# Patient Record
Sex: MALE | Race: WHITE | Employment: STUDENT | RURAL
[De-identification: names, ages, dates, MRNs, and addresses within clinical notes are randomized per-mention and may not be internally consistent; named-entity substitution may affect disease eponyms.]

---
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
## 2021-01-04 DIAGNOSIS — F90.2 ATTENTION DEFICIT HYPERACTIVITY DISORDER (ADHD), COMBINED TYPE: ICD-10-CM

## 2021-01-04 DIAGNOSIS — G89.29 CHRONIC MIDLINE LOW BACK PAIN WITHOUT SCIATICA: ICD-10-CM

## 2021-01-04 DIAGNOSIS — G89.29 CHRONIC MIDLINE THORACIC BACK PAIN: ICD-10-CM

## 2021-01-04 DIAGNOSIS — M54.6 CHRONIC MIDLINE THORACIC BACK PAIN: ICD-10-CM

## 2021-01-04 DIAGNOSIS — M54.50 CHRONIC MIDLINE LOW BACK PAIN WITHOUT SCIATICA: ICD-10-CM

## 2021-01-04 RX ORDER — HYDROCODONE BITARTRATE AND ACETAMINOPHEN 7.5; 325 MG/1; MG/1
1 TABLET ORAL EVERY 6 HOURS PRN
Qty: 90 TABLET | Refills: 0 | Status: SHIPPED | OUTPATIENT
Start: 2021-01-04 | End: 2021-02-22 | Stop reason: SDUPTHER

## 2021-01-04 RX ORDER — DEXTROAMPHETAMINE SACCHARATE, AMPHETAMINE ASPARTATE MONOHYDRATE, DEXTROAMPHETAMINE SULFATE AND AMPHETAMINE SULFATE 5; 5; 5; 5 MG/1; MG/1; MG/1; MG/1
CAPSULE, EXTENDED RELEASE ORAL
Qty: 30 CAPSULE | Refills: 0 | Status: SHIPPED | OUTPATIENT
Start: 2021-01-04 | End: 2021-02-10 | Stop reason: SDUPTHER

## 2021-01-11 RX ORDER — CLONIDINE HYDROCHLORIDE 0.2 MG/1
TABLET ORAL
Qty: 60 TABLET | Refills: 5 | Status: SHIPPED | OUTPATIENT
Start: 2021-01-11 | End: 2021-06-28 | Stop reason: SDUPTHER

## 2021-01-11 NOTE — TELEPHONE ENCOUNTER
Refill request received from pharmacy. Medication pending for approval.       Last Office Visit With PCP:  12/02/2020    Next Visit Date:  No future appointments.     ATN bailey

## 2021-02-10 DIAGNOSIS — F90.2 ATTENTION DEFICIT HYPERACTIVITY DISORDER (ADHD), COMBINED TYPE: ICD-10-CM

## 2021-02-10 DIAGNOSIS — T45.1X5A CHEMOTHERAPY-INDUCED PERIPHERAL NEUROPATHY (HCC): ICD-10-CM

## 2021-02-10 DIAGNOSIS — G89.29 CHRONIC MIDLINE LOW BACK PAIN WITHOUT SCIATICA: ICD-10-CM

## 2021-02-10 DIAGNOSIS — G89.29 CHRONIC MIDLINE THORACIC BACK PAIN: ICD-10-CM

## 2021-02-10 DIAGNOSIS — M54.50 CHRONIC MIDLINE LOW BACK PAIN WITHOUT SCIATICA: ICD-10-CM

## 2021-02-10 DIAGNOSIS — M54.6 CHRONIC MIDLINE THORACIC BACK PAIN: ICD-10-CM

## 2021-02-10 DIAGNOSIS — G62.0 CHEMOTHERAPY-INDUCED PERIPHERAL NEUROPATHY (HCC): ICD-10-CM

## 2021-02-10 RX ORDER — GABAPENTIN 100 MG/1
100 CAPSULE ORAL 2 TIMES DAILY
Qty: 90 CAPSULE | Refills: 2 | Status: SHIPPED | OUTPATIENT
Start: 2021-02-10 | End: 2021-03-22 | Stop reason: SDUPTHER

## 2021-02-10 RX ORDER — DEXTROAMPHETAMINE SACCHARATE, AMPHETAMINE ASPARTATE MONOHYDRATE, DEXTROAMPHETAMINE SULFATE AND AMPHETAMINE SULFATE 5; 5; 5; 5 MG/1; MG/1; MG/1; MG/1
CAPSULE, EXTENDED RELEASE ORAL
Qty: 30 CAPSULE | Refills: 0 | Status: SHIPPED | OUTPATIENT
Start: 2021-02-10 | End: 2021-03-22 | Stop reason: SDUPTHER

## 2021-02-10 NOTE — TELEPHONE ENCOUNTER
Patient called, requested refill. Next Office Visit Date:  No future appointments.     Delia Taylor for review

## 2021-02-11 DIAGNOSIS — J30.89 ENVIRONMENTAL AND SEASONAL ALLERGIES: ICD-10-CM

## 2021-02-12 RX ORDER — CETIRIZINE HYDROCHLORIDE 10 MG/1
TABLET ORAL
Qty: 90 TABLET | Refills: 3 | Status: SHIPPED | OUTPATIENT
Start: 2021-02-12 | End: 2021-03-22 | Stop reason: SDUPTHER

## 2021-02-22 ENCOUNTER — VIRTUAL VISIT (OUTPATIENT)
Dept: FAMILY MEDICINE CLINIC | Age: 21
End: 2021-02-22
Payer: MEDICAID

## 2021-02-22 DIAGNOSIS — M54.50 CHRONIC MIDLINE LOW BACK PAIN WITHOUT SCIATICA: ICD-10-CM

## 2021-02-22 DIAGNOSIS — G89.29 CHRONIC MIDLINE LOW BACK PAIN WITHOUT SCIATICA: ICD-10-CM

## 2021-02-22 DIAGNOSIS — M54.6 CHRONIC MIDLINE THORACIC BACK PAIN: ICD-10-CM

## 2021-02-22 DIAGNOSIS — C91.00: ICD-10-CM

## 2021-02-22 DIAGNOSIS — G89.29 CHRONIC MIDLINE THORACIC BACK PAIN: ICD-10-CM

## 2021-02-22 PROCEDURE — 96160 PT-FOCUSED HLTH RISK ASSMT: CPT | Performed by: NURSE PRACTITIONER

## 2021-02-22 PROCEDURE — 99212 OFFICE O/P EST SF 10 MIN: CPT | Performed by: NURSE PRACTITIONER

## 2021-02-22 RX ORDER — HYDROCODONE BITARTRATE AND ACETAMINOPHEN 7.5; 325 MG/1; MG/1
1 TABLET ORAL EVERY 6 HOURS PRN
Qty: 90 TABLET | Refills: 0 | Status: SHIPPED | OUTPATIENT
Start: 2021-02-22 | End: 2021-03-22 | Stop reason: SDUPTHER

## 2021-02-22 RX ORDER — HYDROCODONE BITARTRATE AND ACETAMINOPHEN 7.5; 325 MG/1; MG/1
1 TABLET ORAL EVERY 6 HOURS PRN
Qty: 90 TABLET | Refills: 0 | Status: CANCELLED | OUTPATIENT
Start: 2021-02-22 | End: 2021-03-24

## 2021-02-22 ASSESSMENT — ENCOUNTER SYMPTOMS
BACK PAIN: 1
TROUBLE SWALLOWING: 0
COUGH: 0
DIARRHEA: 0
ABDOMINAL PAIN: 0
VOMITING: 0
NAUSEA: 0
EYE PAIN: 0
EYE REDNESS: 0
BOWEL INCONTINENCE: 0
SHORTNESS OF BREATH: 0
SORE THROAT: 0
CHEST TIGHTNESS: 0
COLOR CHANGE: 0

## 2021-02-22 ASSESSMENT — PATIENT HEALTH QUESTIONNAIRE - PHQ9
SUM OF ALL RESPONSES TO PHQ QUESTIONS 1-9: 2
SUM OF ALL RESPONSES TO PHQ QUESTIONS 1-9: 2
1. LITTLE INTEREST OR PLEASURE IN DOING THINGS: 1
SUM OF ALL RESPONSES TO PHQ QUESTIONS 1-9: 2

## 2021-02-22 NOTE — PROGRESS NOTES
SUBJECTIVE:    Patient ID: Chayito Perez is a 24 y.o. male. Chief Complaint   Patient presents with    Back Pain    Follow-up    Difficulty Walking     HPI:    Patient following up today for chronic midline thoracic and lumbar back pain with chronic muscle weakness and debility in bilateral lower extremities due to history of cancer havign to have Chemo and radiation at a young age. Patient uses leg braces to assist in ambulation, but is only able to use them for short periods of times due to pain and weakness. Patient needing refill today for his Norco 5mg PO Q6h prn that aids slightly with his chronic pain. Patient graduated virtually from high school but due to his debilities I dont foresee patient to be able to work full time due to his health history, weakness, fatigue, and chronic pain with difficulty ambulating. .    Back Pain  This is a chronic problem. The current episode started more than 1 year ago. The problem occurs constantly. The problem is unchanged. The pain is present in the lumbar spine and thoracic spine. The quality of the pain is described as aching. The pain does not radiate. The pain is at a severity of 8/10. The pain is moderate. The pain is the same all the time. The symptoms are aggravated by lying down, standing, stress and position. Stiffness is present all day. Associated symptoms include leg pain. Pertinent negatives include no abdominal pain, bladder incontinence, bowel incontinence, chest pain, dysuria, fever, headaches, numbness, paresis, paresthesias, pelvic pain, perianal numbness, tingling or weakness. Risk factors include history of cancer, lack of exercise and sedentary lifestyle. He has tried analgesics (leg braces) for the symptoms. The treatment provided mild relief.         Active Ambulatory Problems     Diagnosis Date Noted    ADHD (attention deficit hyperactivity disorder) 03/27/2014    Reactive depression 08/08/2017    Anxiety 08/08/2017    Chemotherapy-induced peripheral neuropathy (HCC) 08/08/2017    Chronic midline thoracic back pain 08/08/2017    Chronic midline low back pain without sciatica 08/08/2017    Bilateral foot-drop 08/08/2017    Muscular weakness 08/08/2017    Fatigue 08/08/2017    Adjustment disorder with mixed anxiety and depressed mood 08/08/2017    Insomnia 08/08/2017    Gastroesophageal reflux disease without esophagitis 08/08/2017    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 Family History   Problem Relation Age of Onset    Other Maternal Grandmother     Other Maternal Grandfather        Patient's medications, allergies, past medical, surgical, social and family histories were reviewed and updated as appropriate. Current Outpatient Medications on File Prior to Visit   Medication Sig Dispense Refill    cetirizine (ZYRTEC) 10 MG tablet TAKE 1 TABLET BY MOUTH EVERY DAY 90 tablet 3    amphetamine-dextroamphetamine (ADDERALL XR) 20 MG extended release capsule take 1 capsule by mouth every morning 30 capsule 0    gabapentin (NEURONTIN) 100 MG capsule Take 1 capsule by mouth 2 times daily for 30 days.  90 capsule 2    cloNIDine (CATAPRES) 0.2 MG tablet TAKE 1 TABLET BY MOUTH TWICE DAILY AS NEEDED FOR ANXIETY 60 tablet 5    pantoprazole (PROTONIX) 20 MG tablet TAKE 1 TABLET BY MOUTH EVERY MORNING BEFORE BREAKFAST 30 tablet 5    RA MELATONIN 5 MG TABS tablet Take 1 tablet by mouth nightly 30 tablet 5    vitamin D3 (CHOLECALCIFEROL) 10 MCG (400 UNIT) TABS tablet Take 1 tablet by mouth daily 30 tablet 5    tretinoin (ALTRALIN) 0.05 % gel Apply 1 Applicatorful topically nightly 1 Tube 2    hydrOXYzine (VISTARIL) 25 MG capsule Take 1 capsule by mouth nightly Take 1 tablet nightly or as needed for Anxiety 60 capsule 5    escitalopram (LEXAPRO) 20 MG tablet Take 1 tablet by mouth daily 30 tablet 5    fluticasone (FLONASE) 50 MCG/ACT nasal spray INSTILL 1 SPRAY INTO EACH NOSTRIL ONCE DAILY FOR 7 DAYS THEN IF NEEDED 64 g 5    aluminum chloride (DRYSOL) 20 % external solution Apply 1 applicator topically nightly 1 Bottle 0    albuterol (PROVENTIL) (2.5 MG/3ML) 0.083% nebulizer solution Take 3 mLs by nebulization daily as needed for Wheezing 120 each 5    promethazine (PHENERGAN) 25 MG tablet Take 1 tablet by mouth as needed for Nausea 30 tablet 5    guaiFENesin (MUCINEX) 600 MG extended release tablet Take 1 tablet by mouth 2 times daily 60 tablet 5    glycopyrrolate (ROBINUL) 1 MG tablet Take 1 mg by mouth 3 times daily      lidocaine-prilocaine (EMLA) 2.5-2.5 % cream APPLY TOPICALLY TO PORT SITE 30 MINUTES PRIOR TO ACCESS 30 g 5     No current facility-administered medications on file prior to visit. Review of Systems   Constitutional: Positive for fatigue. Negative for activity change, appetite change, chills and fever. HENT: Negative for congestion, ear pain, nosebleeds, sore throat and trouble swallowing. Eyes: Negative for pain and redness. Respiratory: Negative for cough, chest tightness and shortness of breath. Cardiovascular: Negative for chest pain, palpitations and leg swelling. Gastrointestinal: Negative for abdominal pain, bowel incontinence, diarrhea, nausea and vomiting. Genitourinary: Negative for bladder incontinence, difficulty urinating, dysuria, flank pain and pelvic pain.    Musculoskeletal: Positive for arthralgias (mid and low back, legs ), back pain (chronic) and gait problem (uses leg braces). Skin: Negative for color change, pallor, rash and wound. Allergic/Immunologic: Negative for environmental allergies and food allergies. Neurological: Negative for dizziness, tingling, weakness, numbness, headaches and paresthesias. Hematological: Negative for adenopathy. Does not bruise/bleed easily. Psychiatric/Behavioral: Negative for agitation, decreased concentration and sleep disturbance. The patient is not nervous/anxious and is not hyperactive. OBJECTIVE:  There were no vitals taken for this visit. Physical Exam  Due to the current efforts to prevent transmission of COVID-19 and also the need to preserve PPE for other caregivers, a face-to-face encounter with the patient was not performed. That being said, all relevant records and diagnostic tests were reviewed, including laboratory results and imaging. Please reference any relevant documentation elsewhere. Care will be coordinated with the primary service. No results found for requested labs within last 30 days. No results found for: Lindsay Fung, LDLCALC    Lab Results   Component Value Date    WBC 6.2 11/28/2020    NEUTROABS 2.3 11/28/2020    HGB 17.5 11/28/2020    HCT 49.9 11/28/2020    MCV 89.1 11/28/2020     11/28/2020     No results found for: TSH     ASSESSMENT/PLAN:     Bryon Johnson was seen today for back pain, follow-up and difficulty walking. Diagnoses and all orders for this visit:    Chronic midline low back pain without sciatica  -     HYDROcodone-acetaminophen (NORCO) 7.5-325 MG per tablet; Take 1 tablet by mouth every 6 hours as needed for Pain for up to 30 days.  -     [place] DEPRESSION SCREEN ANNUAL    Chronic midline thoracic back pain  -     HYDROcodone-acetaminophen (NORCO) 7.5-325 MG per tablet;  Take 1 tablet by mouth every 6 hours as needed for Pain for up to 30 days.  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

## 2021-02-22 NOTE — TELEPHONE ENCOUNTER
Patient called, requested refill.        Next Office Visit Date:  Future Appointments   Date Time Provider Janneth Murcia   2/22/2021  3:45 PM YOUNG Rubio - CNP [address] up to date

## 2021-03-15 DIAGNOSIS — K21.9 GASTROESOPHAGEAL REFLUX DISEASE WITHOUT ESOPHAGITIS: ICD-10-CM

## 2021-03-15 RX ORDER — PANTOPRAZOLE SODIUM 20 MG/1
TABLET, DELAYED RELEASE ORAL
Qty: 30 TABLET | Refills: 5 | Status: SHIPPED | OUTPATIENT
Start: 2021-03-15 | End: 2021-08-03 | Stop reason: SDUPTHER

## 2021-03-16 DIAGNOSIS — F41.9 ANXIETY: ICD-10-CM

## 2021-03-16 DIAGNOSIS — E55.9 VITAMIN D DEFICIENCY: ICD-10-CM

## 2021-03-16 DIAGNOSIS — F32.9 REACTIVE DEPRESSION: ICD-10-CM

## 2021-03-16 RX ORDER — OMEGA-3S/DHA/EPA/FISH OIL/D3 300MG-1000
400 CAPSULE ORAL DAILY
Qty: 30 TABLET | Refills: 5 | Status: SHIPPED | OUTPATIENT
Start: 2021-03-16 | End: 2021-03-22 | Stop reason: SDUPTHER

## 2021-03-16 RX ORDER — HYDROXYZINE PAMOATE 25 MG/1
25 CAPSULE ORAL NIGHTLY
Qty: 60 CAPSULE | Refills: 5 | Status: SHIPPED | OUTPATIENT
Start: 2021-03-16 | End: 2021-07-22

## 2021-03-16 RX ORDER — ESCITALOPRAM OXALATE 20 MG/1
20 TABLET ORAL DAILY
Qty: 30 TABLET | Refills: 5 | Status: SHIPPED | OUTPATIENT
Start: 2021-03-16 | End: 2021-12-27 | Stop reason: SDUPTHER

## 2021-03-16 RX ORDER — TRETINOIN 0.05 G/100G
1 GEL TOPICAL NIGHTLY
Qty: 1 TUBE | Refills: 2 | Status: SHIPPED | OUTPATIENT
Start: 2021-03-16 | End: 2021-06-28

## 2021-03-22 ENCOUNTER — VIRTUAL VISIT (OUTPATIENT)
Dept: FAMILY MEDICINE CLINIC | Age: 21
End: 2021-03-22
Payer: MEDICAID

## 2021-03-22 DIAGNOSIS — G89.29 CHRONIC MIDLINE THORACIC BACK PAIN: ICD-10-CM

## 2021-03-22 DIAGNOSIS — M54.50 CHRONIC MIDLINE LOW BACK PAIN WITHOUT SCIATICA: ICD-10-CM

## 2021-03-22 DIAGNOSIS — F90.2 ATTENTION DEFICIT HYPERACTIVITY DISORDER (ADHD), COMBINED TYPE: ICD-10-CM

## 2021-03-22 DIAGNOSIS — G62.0 CHEMOTHERAPY-INDUCED PERIPHERAL NEUROPATHY (HCC): ICD-10-CM

## 2021-03-22 DIAGNOSIS — G89.29 CHRONIC MIDLINE LOW BACK PAIN WITHOUT SCIATICA: ICD-10-CM

## 2021-03-22 DIAGNOSIS — E55.9 VITAMIN D DEFICIENCY: ICD-10-CM

## 2021-03-22 DIAGNOSIS — J30.89 ENVIRONMENTAL AND SEASONAL ALLERGIES: ICD-10-CM

## 2021-03-22 DIAGNOSIS — M54.6 CHRONIC MIDLINE THORACIC BACK PAIN: ICD-10-CM

## 2021-03-22 DIAGNOSIS — R09.81 SINUS CONGESTION: Primary | ICD-10-CM

## 2021-03-22 DIAGNOSIS — T45.1X5A CHEMOTHERAPY-INDUCED PERIPHERAL NEUROPATHY (HCC): ICD-10-CM

## 2021-03-22 PROCEDURE — 99443 PR PHYS/QHP TELEPHONE EVALUATION 21-30 MIN: CPT | Performed by: NURSE PRACTITIONER

## 2021-03-22 RX ORDER — GABAPENTIN 100 MG/1
100 CAPSULE ORAL 2 TIMES DAILY
Qty: 90 CAPSULE | Refills: 2 | Status: SHIPPED | OUTPATIENT
Start: 2021-03-22 | End: 2021-06-28 | Stop reason: SDUPTHER

## 2021-03-22 RX ORDER — CETIRIZINE HYDROCHLORIDE 10 MG/1
TABLET ORAL
Qty: 90 TABLET | Refills: 3 | Status: SHIPPED | OUTPATIENT
Start: 2021-03-22 | End: 2022-10-18 | Stop reason: SDUPTHER

## 2021-03-22 RX ORDER — HYDROCODONE BITARTRATE AND ACETAMINOPHEN 7.5; 325 MG/1; MG/1
1 TABLET ORAL EVERY 6 HOURS PRN
Qty: 90 TABLET | Refills: 0 | Status: SHIPPED | OUTPATIENT
Start: 2021-03-22 | End: 2021-05-05 | Stop reason: SDUPTHER

## 2021-03-22 RX ORDER — FLUTICASONE PROPIONATE 50 MCG
SPRAY, SUSPENSION (ML) NASAL
Qty: 64 G | Refills: 5 | Status: SHIPPED | OUTPATIENT
Start: 2021-03-22 | End: 2022-10-18 | Stop reason: SDUPTHER

## 2021-03-22 RX ORDER — OMEGA-3S/DHA/EPA/FISH OIL/D3 300MG-1000
400 CAPSULE ORAL DAILY
Qty: 30 TABLET | Refills: 5 | Status: SHIPPED | OUTPATIENT
Start: 2021-03-22 | End: 2022-08-15 | Stop reason: SDUPTHER

## 2021-03-22 RX ORDER — METHYLPREDNISOLONE 4 MG/1
TABLET ORAL
Qty: 21 TABLET | Refills: 0 | Status: SHIPPED | OUTPATIENT
Start: 2021-03-22 | End: 2021-06-28

## 2021-03-22 RX ORDER — DEXTROAMPHETAMINE SACCHARATE, AMPHETAMINE ASPARTATE MONOHYDRATE, DEXTROAMPHETAMINE SULFATE AND AMPHETAMINE SULFATE 5; 5; 5; 5 MG/1; MG/1; MG/1; MG/1
CAPSULE, EXTENDED RELEASE ORAL
Qty: 30 CAPSULE | Refills: 0 | Status: SHIPPED | OUTPATIENT
Start: 2021-03-22 | End: 2021-06-07 | Stop reason: SDUPTHER

## 2021-03-22 ASSESSMENT — ENCOUNTER SYMPTOMS
BOWEL INCONTINENCE: 0
COLOR CHANGE: 0
BACK PAIN: 1
SORE THROAT: 0
SHORTNESS OF BREATH: 0
DIARRHEA: 0
ABDOMINAL PAIN: 0
EYE REDNESS: 0
NAUSEA: 0
EYE PAIN: 0
TROUBLE SWALLOWING: 0
CHEST TIGHTNESS: 0
COUGH: 0
VOMITING: 0

## 2021-03-22 NOTE — PROGRESS NOTES
SUBJECTIVE:    Patient ID: Makenzie Watts is a 22 y.o. male. Chief Complaint   Patient presents with    Back Pain    ADD    Follow-up    Medication Check     HPI:  Established patient. Telephone encounter   Patient following up today for chronic midline thoracic and lumbar back pain with chronic muscle weakness and debility in bilateral lower extremities due to history of cancer havign to have Chemo and radiation at a young age. Patient uses leg braces to assist in ambulation, but is only able to use them for short periods of times due to pain and weakness. Patient needing refill today for his Norco 5mg PO Q6h prn that aids slightly with his chronic pain. Patient graduated virtually from high school but due to his debilities I dont foresee patient to be able to work full time due to his health history, weakness, fatigue, and chronic pain with difficulty ambulating. .    Back Pain  This is a chronic problem. The current episode started more than 1 year ago. The problem occurs constantly. The problem is unchanged. The pain is present in the lumbar spine and thoracic spine. The quality of the pain is described as aching. The pain does not radiate. The pain is at a severity of 8/10. The pain is moderate. The pain is the same all the time. The symptoms are aggravated by lying down, standing, stress and position. Stiffness is present all day. Associated symptoms include leg pain. Pertinent negatives include no abdominal pain, bladder incontinence, bowel incontinence, chest pain, dysuria, fever, headaches, numbness, paresis, paresthesias, pelvic pain, perianal numbness, tingling or weakness. Risk factors include history of cancer, lack of exercise and sedentary lifestyle. He has tried analgesics (leg braces) for the symptoms. The treatment provided mild relief.         Active Ambulatory Problems     Diagnosis Date Noted    ADHD (attention deficit hyperactivity disorder) 03/27/2014    Reactive depression 08/08/2017    Anxiety 08/08/2017    Chemotherapy-induced peripheral neuropathy (HCC) 08/08/2017    Chronic midline thoracic back pain 08/08/2017    Chronic midline low back pain without sciatica 08/08/2017    Bilateral foot-drop 08/08/2017    Muscular weakness 08/08/2017    Fatigue 08/08/2017    Adjustment disorder with mixed anxiety and depressed mood 08/08/2017    Insomnia 08/08/2017    Gastroesophageal reflux disease without esophagitis 08/08/2017    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 No pertinent surgical history. Family History   Problem Relation Age of Onset    Other Maternal Grandmother     Other Maternal Grandfather        Patient's medications, allergies, past medical, surgical, social and family histories were reviewed and updated as appropriate.   Current Outpatient Medications on File Prior to Visit   Medication Sig Dispense Refill    tretinoin (ALTRALIN) 0.05 % gel Apply 1 Applicatorful topically nightly 1 Tube 2    escitalopram (LEXAPRO) 20 MG tablet Take 1 tablet by mouth daily 30 tablet 5    hydrOXYzine (VISTARIL) 25 MG capsule Take 1 capsule by mouth nightly Take 1 tablet nightly or as needed for Anxiety 60 capsule 5    pantoprazole (PROTONIX) 20 MG tablet TAKE 1 TABLET BY MOUTH EVERY MORNING BEFORE BREAKFAST 30 tablet 5    cloNIDine (CATAPRES) 0.2 MG tablet TAKE 1 TABLET BY MOUTH TWICE DAILY AS NEEDED FOR ANXIETY 60 tablet 5    RA MELATONIN 5 MG TABS tablet Take 1 tablet by mouth nightly 30 tablet 5    aluminum chloride (DRYSOL) 20 % external solution Apply 1 applicator topically nightly 1 Bottle 0    albuterol (PROVENTIL) (2.5 MG/3ML) 0.083% nebulizer solution Take 3 mLs by nebulization daily as needed for Wheezing 120 each 5    promethazine (PHENERGAN) 25 MG tablet Take 1 tablet by mouth as needed for Nausea 30 tablet 5    guaiFENesin (MUCINEX) 600 MG extended release tablet Take 1 tablet by mouth 2 times daily 60 tablet 5    glycopyrrolate (ROBINUL) 1 MG tablet Take 1 mg by mouth 3 times daily      lidocaine-prilocaine (EMLA) 2.5-2.5 % cream APPLY TOPICALLY TO PORT SITE 30 MINUTES PRIOR TO ACCESS 30 g 5     No current facility-administered medications on file prior to visit. Review of Systems   Constitutional: Positive for fatigue. Negative for activity change, appetite change, chills and fever. HENT: Positive for congestion and ear pain. Negative for nosebleeds, sore throat and trouble swallowing. Eyes: Negative for pain and redness. Respiratory: Negative for cough, chest tightness and shortness of breath. Cardiovascular: Negative for chest pain, palpitations and leg swelling. Gastrointestinal: Negative for abdominal pain, bowel incontinence, diarrhea, nausea and vomiting. Genitourinary: Negative for bladder incontinence, difficulty urinating, dysuria, flank pain and pelvic pain. Musculoskeletal: Positive for arthralgias (mid and low back, legs ), back pain and gait problem (uses leg braces). Skin: Negative for color change, pallor, rash and wound. Allergic/Immunologic: Positive for environmental allergies. Negative for food allergies. Neurological: Negative for dizziness, tingling, weakness, numbness, headaches and paresthesias. Hematological: Negative for adenopathy. Does not bruise/bleed easily. Psychiatric/Behavioral: Positive for sleep disturbance. Negative for agitation and decreased concentration. The patient is nervous/anxious and is hyperactive. ADD- stable with Adderall      OBJECTIVE:  There were no vitals taken for this visit. Physical Exam  Due to the current efforts to prevent transmission of COVID-19 and also the need to preserve PPE for other caregivers, a face-to-face encounter with the patient was not performed. That being said, all relevant records and diagnostic tests were reviewed, including laboratory results and imaging. Please reference any relevant documentation elsewhere. Care will be coordinated with the primary service. No results found for requested labs within last 30 days. No results found for: Sourav Young, LDLCALC    Lab Results   Component Value Date    WBC 6.2 11/28/2020    NEUTROABS 2.3 11/28/2020    HGB 17.5 11/28/2020    HCT 49.9 11/28/2020    MCV 89.1 11/28/2020     11/28/2020     No results found for: TSH     ASSESSMENT/PLAN:    Sherin Habermann was seen today for back pain, add, follow-up and medication check. Diagnoses and all orders for this visit:    Sinus congestion  -     cetirizine (ZYRTEC) 10 MG tablet; TAKE 1 TABLET BY MOUTH EVERY DAY  -     fluticasone (FLONASE) 50 MCG/ACT nasal spray; INSTILL 1 SPRAY INTO EACH NOSTRIL ONCE DAILY FOR 7 DAYS THEN IF NEEDED  -     methylPREDNISolone (MEDROL DOSEPACK) 4 MG tablet; Take by mouth [id number] stop    Vitamin D deficiency  -     vitamin D3 (CHOLECALCIFEROL) 10 MCG (400 UNIT) TABS tablet; Take 1 tablet by mouth daily    Chronic midline low back pain without sciatica  -     HYDROcodone-acetaminophen (NORCO) 7.5-325 MG per tablet; Take 1 tablet by mouth every 6 hours as needed for Pain for up to 30 days.  -     gabapentin (NEURONTIN) 100 MG capsule; Take 1 capsule by mouth 2 times daily for 30 days. Chronic midline thoracic back pain  -     HYDROcodone-acetaminophen (NORCO) 7.5-325 MG per tablet; Take 1 tablet by mouth every 6 hours as needed for Pain for up to 30 days.  -     gabapentin (NEURONTIN) 100 MG capsule; Take 1 capsule by mouth 2 times daily for 30 days. Environmental and seasonal allergies  -     cetirizine (ZYRTEC) 10 MG tablet; TAKE 1 TABLET BY MOUTH EVERY DAY  -     fluticasone (FLONASE) 50 MCG/ACT nasal spray; INSTILL 1 SPRAY INTO EACH NOSTRIL ONCE DAILY FOR 7 DAYS THEN IF NEEDED    Chemotherapy-induced peripheral neuropathy (HCC)  -     gabapentin (NEURONTIN) 100 MG capsule; Take 1 capsule by mouth 2 times daily for 30 days. Richardson Jeffrey is a 22 y.o. male evaluated via telephone on 3/22/2021. Consent:  He and/or health care decision maker is aware that that he may receive a bill for this telephone service, depending on his insurance coverage, and has provided verbal consent to proceed: Yes      Documentation:  I communicated with the patient and/or health care decision maker about see HPI. Details of this discussion including any medical advice provided: See assessment/Plan      I affirm this is a Patient Initiated Episode with a Patient who has not had a related appointment within my department in the past 7 days or scheduled within the next 24 hours. Patient identification was verified at the start of the visit: Yes    Total Time: 21 minutes     The visit was conducted pursuant to the emergency declaration under the [facility], [address] [place] [address] authority and the [employer] and [employer] Act. Patient identification was verified, and a caregiver was present when appropriate. The patient was located in a state where the provider was credentialed to provide care. Note: not billable if this call serves to triage the patient into an appointment for the relevant concern      Meka Chaparro       Controlled Substances Monitoring:     RX Monitoring 2/22/2021   Attestation -   Periodic Controlled Substance Monitoring Possible medication side effects, risk of tolerance/dependence & alternative treatments discussed. ;Obtaining appropriate analgesic effect of treatment.    Chronic Pain > 50 MEDD -   Chronic Pain > 80 MEDD -        PATIENT COUNSELING     Counseling was provided today regarding the following topics: Healthy eating habits, Regular exercise, substance abuse and healthy sleep habits. Discussed use, benefit, and side effects of prescribed medications. Barriers to medication compliance addressed. All patient questions answered. Patient voiced understanding. Medications Discontinued During This Encounter   Medication Reason    fluticasone (FLONASE) 50 MCG/ACT nasal spray REORDER    gabapentin (NEURONTIN) 100 MG capsule REORDER    cetirizine (ZYRTEC) 10 MG tablet REORDER    HYDROcodone-acetaminophen (NORCO) 7.5-325 MG per tablet REORDER    vitamin D3 (CHOLECALCIFEROL) 10 MCG (400 UNIT) TABS tablet REORDER       Return in about 1 month (around 4/22/2021), or if symptoms worsen or fail to improve. YOUNG Alexander - CNP     Education was provided for discussed topics. Call the office with worsening complaints or any side effects to any medications. If an emergency please call 911. Please note: This chart was generated using Dragon dictation software. Although every effort was made to ensure the accuracy of this automated transcription, some errors in transcription may have occurred.

## 2021-05-05 DIAGNOSIS — G89.29 CHRONIC MIDLINE THORACIC BACK PAIN: ICD-10-CM

## 2021-05-05 DIAGNOSIS — M54.6 CHRONIC MIDLINE THORACIC BACK PAIN: ICD-10-CM

## 2021-05-05 DIAGNOSIS — M54.50 CHRONIC MIDLINE LOW BACK PAIN WITHOUT SCIATICA: ICD-10-CM

## 2021-05-05 DIAGNOSIS — G89.29 CHRONIC MIDLINE LOW BACK PAIN WITHOUT SCIATICA: ICD-10-CM

## 2021-05-05 RX ORDER — HYDROCODONE BITARTRATE AND ACETAMINOPHEN 7.5; 325 MG/1; MG/1
1 TABLET ORAL EVERY 6 HOURS PRN
Qty: 90 TABLET | Refills: 0 | Status: SHIPPED | OUTPATIENT
Start: 2021-05-05 | End: 2021-06-07 | Stop reason: SDUPTHER

## 2021-05-29 DIAGNOSIS — E55.9 VITAMIN D DEFICIENCY: ICD-10-CM

## 2021-06-01 RX ORDER — OMEGA-3S/DHA/EPA/FISH OIL/D3 300MG-1000
400 CAPSULE ORAL DAILY
Qty: 30 TABLET | Refills: 5 | OUTPATIENT
Start: 2021-06-01

## 2021-06-01 NOTE — TELEPHONE ENCOUNTER
Refill request received from pharmacy. Medication pending for approval.       Last Office Visit With PCP:  03/22/21    Next Visit Date:  No future appointments.     TAN NEGRON

## 2021-06-03 DIAGNOSIS — F90.2 ATTENTION DEFICIT HYPERACTIVITY DISORDER (ADHD), COMBINED TYPE: ICD-10-CM

## 2021-06-03 DIAGNOSIS — M54.6 CHRONIC MIDLINE THORACIC BACK PAIN: ICD-10-CM

## 2021-06-03 DIAGNOSIS — M54.50 CHRONIC MIDLINE LOW BACK PAIN WITHOUT SCIATICA: ICD-10-CM

## 2021-06-03 DIAGNOSIS — G89.29 CHRONIC MIDLINE THORACIC BACK PAIN: ICD-10-CM

## 2021-06-03 DIAGNOSIS — G89.29 CHRONIC MIDLINE LOW BACK PAIN WITHOUT SCIATICA: ICD-10-CM

## 2021-06-03 NOTE — TELEPHONE ENCOUNTER
Patient called, requested refill.        Next Office Visit Date:  Future Appointments   Date Time Provider Janneth Murcia   6/28/2021  9:45 AM Levi Dan MD [address]ub[address] up to date

## 2021-06-07 RX ORDER — HYDROCODONE BITARTRATE AND ACETAMINOPHEN 7.5; 325 MG/1; MG/1
1 TABLET ORAL EVERY 6 HOURS PRN
Qty: 90 TABLET | Refills: 0 | Status: SHIPPED | OUTPATIENT
Start: 2021-06-07 | End: 2021-06-28 | Stop reason: SDUPTHER

## 2021-06-07 RX ORDER — DEXTROAMPHETAMINE SACCHARATE, AMPHETAMINE ASPARTATE MONOHYDRATE, DEXTROAMPHETAMINE SULFATE AND AMPHETAMINE SULFATE 5; 5; 5; 5 MG/1; MG/1; MG/1; MG/1
CAPSULE, EXTENDED RELEASE ORAL
Qty: 30 CAPSULE | Refills: 0 | Status: SHIPPED | OUTPATIENT
Start: 2021-06-07 | End: 2021-06-28 | Stop reason: SDUPTHER

## 2021-06-28 ENCOUNTER — OFFICE VISIT (OUTPATIENT)
Dept: FAMILY MEDICINE CLINIC | Age: 21
End: 2021-06-28
Payer: MEDICAID

## 2021-06-28 VITALS
WEIGHT: 139.1 LBS | HEART RATE: 69 BPM | RESPIRATION RATE: 18 BRPM | TEMPERATURE: 98.8 F | OXYGEN SATURATION: 97 % | SYSTOLIC BLOOD PRESSURE: 88 MMHG | DIASTOLIC BLOOD PRESSURE: 62 MMHG | HEIGHT: 70 IN | BODY MASS INDEX: 19.91 KG/M2

## 2021-06-28 DIAGNOSIS — T45.1X5A CHEMOTHERAPY-INDUCED PERIPHERAL NEUROPATHY (HCC): ICD-10-CM

## 2021-06-28 DIAGNOSIS — G62.0 CHEMOTHERAPY-INDUCED PERIPHERAL NEUROPATHY (HCC): ICD-10-CM

## 2021-06-28 DIAGNOSIS — M54.50 CHRONIC MIDLINE LOW BACK PAIN WITHOUT SCIATICA: ICD-10-CM

## 2021-06-28 DIAGNOSIS — G47.00 INSOMNIA, UNSPECIFIED TYPE: ICD-10-CM

## 2021-06-28 DIAGNOSIS — M54.6 CHRONIC MIDLINE THORACIC BACK PAIN: ICD-10-CM

## 2021-06-28 DIAGNOSIS — G89.29 CHRONIC MIDLINE THORACIC BACK PAIN: ICD-10-CM

## 2021-06-28 DIAGNOSIS — H93.13 TINNITUS, BILATERAL: Primary | ICD-10-CM

## 2021-06-28 DIAGNOSIS — F90.2 ATTENTION DEFICIT HYPERACTIVITY DISORDER (ADHD), COMBINED TYPE: ICD-10-CM

## 2021-06-28 DIAGNOSIS — G89.29 CHRONIC MIDLINE LOW BACK PAIN WITHOUT SCIATICA: ICD-10-CM

## 2021-06-28 PROCEDURE — 99214 OFFICE O/P EST MOD 30 MIN: CPT | Performed by: FAMILY MEDICINE

## 2021-06-28 PROCEDURE — G8420 CALC BMI NORM PARAMETERS: HCPCS | Performed by: FAMILY MEDICINE

## 2021-06-28 PROCEDURE — G8427 DOCREV CUR MEDS BY ELIG CLIN: HCPCS | Performed by: FAMILY MEDICINE

## 2021-06-28 PROCEDURE — 1036F TOBACCO NON-USER: CPT | Performed by: FAMILY MEDICINE

## 2021-06-28 RX ORDER — GABAPENTIN 100 MG/1
100 CAPSULE ORAL 2 TIMES DAILY
Qty: 90 CAPSULE | Refills: 2 | Status: SHIPPED | OUTPATIENT
Start: 2021-06-28 | End: 2022-01-05 | Stop reason: SDUPTHER

## 2021-06-28 RX ORDER — DEXTROAMPHETAMINE SACCHARATE, AMPHETAMINE ASPARTATE MONOHYDRATE, DEXTROAMPHETAMINE SULFATE AND AMPHETAMINE SULFATE 5; 5; 5; 5 MG/1; MG/1; MG/1; MG/1
CAPSULE, EXTENDED RELEASE ORAL
Qty: 30 CAPSULE | Refills: 0 | Status: SHIPPED | OUTPATIENT
Start: 2021-06-28 | End: 2021-09-28 | Stop reason: SDUPTHER

## 2021-06-28 RX ORDER — HYDROCODONE BITARTRATE AND ACETAMINOPHEN 7.5; 325 MG/1; MG/1
1 TABLET ORAL EVERY 6 HOURS PRN
Qty: 90 TABLET | Refills: 0 | Status: SHIPPED | OUTPATIENT
Start: 2021-06-28 | End: 2021-06-28 | Stop reason: SDUPTHER

## 2021-06-28 RX ORDER — DEXTROAMPHETAMINE SACCHARATE, AMPHETAMINE ASPARTATE MONOHYDRATE, DEXTROAMPHETAMINE SULFATE AND AMPHETAMINE SULFATE 5; 5; 5; 5 MG/1; MG/1; MG/1; MG/1
CAPSULE, EXTENDED RELEASE ORAL
Qty: 30 CAPSULE | Refills: 0 | Status: SHIPPED | OUTPATIENT
Start: 2021-06-28 | End: 2021-06-28 | Stop reason: SDUPTHER

## 2021-06-28 RX ORDER — HYDROCODONE BITARTRATE AND ACETAMINOPHEN 7.5; 325 MG/1; MG/1
1 TABLET ORAL EVERY 6 HOURS PRN
Qty: 90 TABLET | Refills: 0 | Status: SHIPPED | OUTPATIENT
Start: 2021-06-28 | End: 2021-09-08 | Stop reason: SDUPTHER

## 2021-06-28 RX ORDER — CLONIDINE HYDROCHLORIDE 0.2 MG/1
TABLET ORAL
Qty: 60 TABLET | Refills: 5 | Status: SHIPPED | OUTPATIENT
Start: 2021-06-28 | End: 2022-01-10

## 2021-06-28 RX ORDER — PSYLLIUM HUSK 3.4 G/7G
1 POWDER ORAL NIGHTLY
Qty: 30 TABLET | Refills: 5 | Status: SHIPPED | OUTPATIENT
Start: 2021-06-28 | End: 2022-02-23

## 2021-06-28 SDOH — ECONOMIC STABILITY: FOOD INSECURITY: WITHIN THE PAST 12 MONTHS, YOU WORRIED THAT YOUR FOOD WOULD RUN OUT BEFORE YOU GOT MONEY TO BUY MORE.: NEVER TRUE

## 2021-06-28 SDOH — ECONOMIC STABILITY: FOOD INSECURITY: WITHIN THE PAST 12 MONTHS, THE FOOD YOU BOUGHT JUST DIDN'T LAST AND YOU DIDN'T HAVE MONEY TO GET MORE.: NEVER TRUE

## 2021-06-28 ASSESSMENT — ENCOUNTER SYMPTOMS
GASTROINTESTINAL NEGATIVE: 1
EYES NEGATIVE: 1
RESPIRATORY NEGATIVE: 1

## 2021-06-28 ASSESSMENT — SOCIAL DETERMINANTS OF HEALTH (SDOH): HOW HARD IS IT FOR YOU TO PAY FOR THE VERY BASICS LIKE FOOD, HOUSING, MEDICAL CARE, AND HEATING?: NOT VERY HARD

## 2021-06-28 NOTE — PROGRESS NOTES
from Last 3 Encounters:   06/28/21 18   12/02/20 18   11/28/20 18     Past medical, surgical, family and social history were reviewed and updated with the patient. Physical Exam  Constitutional:       Appearance: Normal appearance. HENT:      Head: Normocephalic and atraumatic. Right Ear: Hearing, ear canal and external ear normal. Tympanic membrane is erythematous. Left Ear: Hearing, tympanic membrane, ear canal and external ear normal.      Nose: Mucosal edema present. Mouth/Throat:      Mouth: Mucous membranes are moist.      Pharynx: Posterior oropharyngeal erythema present. Cardiovascular:      Rate and Rhythm: Normal rate and regular rhythm. Pulses: Normal pulses. Heart sounds: Normal heart sounds. Pulmonary:      Effort: Pulmonary effort is normal.      Breath sounds: Normal breath sounds. Musculoskeletal:         General: Normal range of motion. Cervical back: Normal range of motion and neck supple. Skin:     General: Skin is warm and dry. Capillary Refill: Capillary refill takes less than 2 seconds. Neurological:      Mental Status: He is alert and oriented to person, place, and time. Psychiatric:         Mood and Affect: Mood normal.         Thought Content: Thought content normal.         Judgment: Judgment normal.         No results found for requested labs within last 30 days. No results found for: Desire Magaña, LDLCALC    Lab Results   Component Value Date    WBC 6.2 11/28/2020    NEUTROABS 2.3 11/28/2020    HGB 17.5 11/28/2020    HCT 49.9 11/28/2020    MCV 89.1 11/28/2020     11/28/2020     No results found for: TSH    ASSESSMENT:    Diagnosis Orders   1. Tinnitus, bilateral     2. Chronic midline low back pain without sciatica  gabapentin (NEURONTIN) 100 MG capsule    HYDROcodone-acetaminophen (NORCO) 7.5-325 MG per tablet    DISCONTINUED: HYDROcodone-acetaminophen (NORCO) 7.5-325 MG per tablet   3.  Chronic midline thoracic back pain gabapentin (NEURONTIN) 100 MG capsule    HYDROcodone-acetaminophen (NORCO) 7.5-325 MG per tablet    DISCONTINUED: HYDROcodone-acetaminophen (NORCO) 7.5-325 MG per tablet   4. Chemotherapy-induced peripheral neuropathy (HCC)  gabapentin (NEURONTIN) 100 MG capsule   5. Insomnia, unspecified type  RA MELATONIN 5 MG TABS tablet   6. Attention deficit hyperactivity disorder (ADHD), combined type  amphetamine-dextroamphetamine (ADDERALL XR) 20 MG extended release capsule    DISCONTINUED: amphetamine-dextroamphetamine (ADDERALL XR) 20 MG extended release capsule        PLAN:  Orders Placed This Encounter   Medications    gabapentin (NEURONTIN) 100 MG capsule     Sig: Take 1 capsule by mouth 2 times daily for 30 days. Dispense:  90 capsule     Refill:  2    DISCONTD: HYDROcodone-acetaminophen (NORCO) 7.5-325 MG per tablet     Sig: Take 1 tablet by mouth every 6 hours as needed for Pain for up to 30 days. Dispense:  90 tablet     Refill:  0     Reduce doses taken as pain becomes manageable    cloNIDine (CATAPRES) 0.2 MG tablet     Sig: TAKE 1 TABLET BY MOUTH TWICE DAILY AS NEEDED FOR ANXIETY     Dispense:  60 tablet     Refill:  5    RA MELATONIN 5 MG TABS tablet     Sig: Take 1 tablet by mouth nightly     Dispense:  30 tablet     Refill:  5    DISCONTD: amphetamine-dextroamphetamine (ADDERALL XR) 20 MG extended release capsule     Sig: take 1 capsule by mouth every morning     Dispense:  30 capsule     Refill:  0    HYDROcodone-acetaminophen (NORCO) 7.5-325 MG per tablet     Sig: Take 1 tablet by mouth every 6 hours as needed for Pain for up to 30 days.  To be filled on or august 5th     Dispense:  90 tablet     Refill:  0     Reduce doses taken as pain becomes manageable    amphetamine-dextroamphetamine (ADDERALL XR) 20 MG extended release capsule     Sig: take 1 capsule by mouth every morning     Dispense:  30 capsule     Refill:  0     To be filled on or after august 5th        Medications Discontinued During This Encounter   Medication Reason    lidocaine-prilocaine (EMLA) 2.5-2.5 % cream LIST CLEANUP    methylPREDNISolone (MEDROL DOSEPACK) 4 MG tablet     tretinoin (ALTRALIN) 0.05 % gel LIST CLEANUP    RA MELATONIN 5 MG TABS tablet REORDER    cloNIDine (CATAPRES) 0.2 MG tablet REORDER    gabapentin (NEURONTIN) 100 MG capsule REORDER    HYDROcodone-acetaminophen (NORCO) 7.5-325 MG per tablet REORDER    amphetamine-dextroamphetamine (ADDERALL XR) 20 MG extended release capsule REORDER    HYDROcodone-acetaminophen (NORCO) 7.5-325 MG per tablet REORDER    amphetamine-dextroamphetamine (ADDERALL XR) 20 MG extended release capsule REORDER       Controlled Substances Monitoring: Attestation: The Prescription Monitoring Report for this patient was reviewed today. Nayla Dela Cruz MD)  Periodic Controlled Substance Monitoring: Possible medication side effects, risk of tolerance/dependence & alternative treatments discussed., No signs of potential drug abuse or diversion identified. , Assessed functional status., Obtaining appropriate analgesic effect of treatment. , Random urine drug screen sent today. Nayla Dela Cruz MD)    Please note: This chart was generated using Dragon dictation software. Although every effort was made to ensure the accuracy of this automated transcription, some errors in transcription may have occurred.

## 2021-06-28 NOTE — PROGRESS NOTES
Chief Complaint   Patient presents with    Lower Back Pain    ADHD       Have you seen any other physician or provider since your last visit yes - Oncology and ENT    Have you had any other diagnostic tests since your last visit?  yes - Labs    Have you changed or stopped any medications since your last visit? no     d

## 2021-07-22 DIAGNOSIS — F41.9 ANXIETY: ICD-10-CM

## 2021-07-22 RX ORDER — HYDROXYZINE PAMOATE 25 MG/1
CAPSULE ORAL
Qty: 60 CAPSULE | Refills: 5 | Status: SHIPPED | OUTPATIENT
Start: 2021-07-22 | End: 2022-08-08 | Stop reason: SDUPTHER

## 2021-07-22 NOTE — TELEPHONE ENCOUNTER
Refill request received from pharmacy.  Medication pending for approval.       Last Office Visit With PCP:  06/28/2021    Next Visit Date:  Future Appointments   Date Time Provider Janneth Murcia   9/28/2021 10:00 AM Kirit Casas MD [address]

## 2021-08-03 DIAGNOSIS — K21.9 GASTROESOPHAGEAL REFLUX DISEASE WITHOUT ESOPHAGITIS: ICD-10-CM

## 2021-08-03 RX ORDER — PANTOPRAZOLE SODIUM 20 MG/1
TABLET, DELAYED RELEASE ORAL
Qty: 30 TABLET | Refills: 5 | Status: SHIPPED | OUTPATIENT
Start: 2021-08-03 | End: 2022-03-30

## 2021-09-07 DIAGNOSIS — G89.29 CHRONIC MIDLINE LOW BACK PAIN WITHOUT SCIATICA: ICD-10-CM

## 2021-09-07 DIAGNOSIS — M54.50 CHRONIC MIDLINE LOW BACK PAIN WITHOUT SCIATICA: ICD-10-CM

## 2021-09-07 DIAGNOSIS — M54.6 CHRONIC MIDLINE THORACIC BACK PAIN: ICD-10-CM

## 2021-09-07 DIAGNOSIS — G89.29 CHRONIC MIDLINE THORACIC BACK PAIN: ICD-10-CM

## 2021-09-07 NOTE — TELEPHONE ENCOUNTER
Patient called, requested refill.        Next Office Visit Date:  Future Appointments   Date Time Provider Janneth Murcia   9/28/2021 10:00 AM Anthony Charles MD [address] please review via PDMP

## 2021-09-07 NOTE — TELEPHONE ENCOUNTER
----- Message from Maggi Roy sent at 9/7/2021 11:15 AM EDT -----  HYDROcodone-acetaminophen (NORCO) 7.5-325 MG per tablet [[id number]]    Woody Rodriguezxon is needing this refilled and any other medication also due at this time.  Needs to be sent to [facility] in [place]

## 2021-09-08 RX ORDER — HYDROCODONE BITARTRATE AND ACETAMINOPHEN 7.5; 325 MG/1; MG/1
1 TABLET ORAL EVERY 6 HOURS PRN
Qty: 90 TABLET | Refills: 0 | Status: SHIPPED | OUTPATIENT
Start: 2021-09-08 | End: 2021-09-28 | Stop reason: SDUPTHER

## 2021-09-14 ENCOUNTER — TELEPHONE (OUTPATIENT)
Dept: FAMILY MEDICINE CLINIC | Age: 21
End: 2021-09-14

## 2021-09-15 DIAGNOSIS — R19.7 DIARRHEA, UNSPECIFIED TYPE: Primary | ICD-10-CM

## 2021-09-15 RX ORDER — DIPHENOXYLATE HYDROCHLORIDE AND ATROPINE SULFATE 2.5; .025 MG/1; MG/1
1 TABLET ORAL 4 TIMES DAILY PRN
Qty: 40 TABLET | Refills: 0 | Status: SHIPPED | OUTPATIENT
Start: 2021-09-15 | End: 2021-09-25

## 2021-09-28 ENCOUNTER — OFFICE VISIT (OUTPATIENT)
Dept: FAMILY MEDICINE CLINIC | Age: 21
End: 2021-09-28
Payer: MEDICAID

## 2021-09-28 VITALS
WEIGHT: 139 LBS | HEART RATE: 64 BPM | OXYGEN SATURATION: 99 % | DIASTOLIC BLOOD PRESSURE: 64 MMHG | SYSTOLIC BLOOD PRESSURE: 106 MMHG | TEMPERATURE: 98.5 F | BODY MASS INDEX: 19.94 KG/M2

## 2021-09-28 DIAGNOSIS — G89.29 CHRONIC MIDLINE THORACIC BACK PAIN: ICD-10-CM

## 2021-09-28 DIAGNOSIS — Z23 NEED FOR INFLUENZA VACCINATION: ICD-10-CM

## 2021-09-28 DIAGNOSIS — G62.0 CHEMOTHERAPY-INDUCED PERIPHERAL NEUROPATHY (HCC): ICD-10-CM

## 2021-09-28 DIAGNOSIS — M54.6 CHRONIC MIDLINE THORACIC BACK PAIN: ICD-10-CM

## 2021-09-28 DIAGNOSIS — T45.1X5A CHEMOTHERAPY-INDUCED PERIPHERAL NEUROPATHY (HCC): ICD-10-CM

## 2021-09-28 DIAGNOSIS — F90.2 ATTENTION DEFICIT HYPERACTIVITY DISORDER (ADHD), COMBINED TYPE: ICD-10-CM

## 2021-09-28 DIAGNOSIS — M54.50 CHRONIC MIDLINE LOW BACK PAIN WITHOUT SCIATICA: Primary | ICD-10-CM

## 2021-09-28 DIAGNOSIS — G89.29 CHRONIC MIDLINE LOW BACK PAIN WITHOUT SCIATICA: Primary | ICD-10-CM

## 2021-09-28 PROCEDURE — 90471 IMMUNIZATION ADMIN: CPT | Performed by: FAMILY MEDICINE

## 2021-09-28 PROCEDURE — G8420 CALC BMI NORM PARAMETERS: HCPCS | Performed by: FAMILY MEDICINE

## 2021-09-28 PROCEDURE — 90674 CCIIV4 VAC NO PRSV 0.5 ML IM: CPT | Performed by: FAMILY MEDICINE

## 2021-09-28 PROCEDURE — 1036F TOBACCO NON-USER: CPT | Performed by: FAMILY MEDICINE

## 2021-09-28 PROCEDURE — G8427 DOCREV CUR MEDS BY ELIG CLIN: HCPCS | Performed by: FAMILY MEDICINE

## 2021-09-28 PROCEDURE — 99214 OFFICE O/P EST MOD 30 MIN: CPT | Performed by: FAMILY MEDICINE

## 2021-09-28 RX ORDER — DEXTROAMPHETAMINE SACCHARATE, AMPHETAMINE ASPARTATE MONOHYDRATE, DEXTROAMPHETAMINE SULFATE AND AMPHETAMINE SULFATE 5; 5; 5; 5 MG/1; MG/1; MG/1; MG/1
CAPSULE, EXTENDED RELEASE ORAL
Qty: 30 CAPSULE | Refills: 0 | Status: SHIPPED | OUTPATIENT
Start: 2021-09-28 | End: 2021-10-20 | Stop reason: SDUPTHER

## 2021-09-28 RX ORDER — HYDROCODONE BITARTRATE AND ACETAMINOPHEN 7.5; 325 MG/1; MG/1
1 TABLET ORAL EVERY 6 HOURS PRN
Qty: 90 TABLET | Refills: 0 | Status: SHIPPED | OUTPATIENT
Start: 2021-09-28 | End: 2021-10-20 | Stop reason: SDUPTHER

## 2021-09-28 RX ORDER — HYDROCODONE BITARTRATE AND ACETAMINOPHEN 7.5; 325 MG/1; MG/1
1 TABLET ORAL EVERY 6 HOURS PRN
Qty: 90 TABLET | Refills: 0 | Status: SHIPPED | OUTPATIENT
Start: 2021-09-28 | End: 2021-09-28 | Stop reason: SDUPTHER

## 2021-09-28 ASSESSMENT — ENCOUNTER SYMPTOMS
RESPIRATORY NEGATIVE: 1
EYES NEGATIVE: 1
GASTROINTESTINAL NEGATIVE: 1

## 2021-09-28 NOTE — PROGRESS NOTES
tympanic membrane, ear canal and external ear normal.      Nose: Mucosal edema present. Mouth/Throat:      Mouth: Mucous membranes are moist.      Pharynx: Oropharynx is clear. Eyes:      Pupils: Pupils are equal, round, and reactive to light. Cardiovascular:      Rate and Rhythm: Normal rate and regular rhythm. Pulses: Normal pulses. Heart sounds: Normal heart sounds. Pulmonary:      Effort: Pulmonary effort is normal.      Breath sounds: Normal breath sounds. Musculoskeletal:         General: Normal range of motion. Cervical back: Normal range of motion and neck supple. Skin:     General: Skin is warm and dry. Capillary Refill: Capillary refill takes less than 2 seconds. Neurological:      Mental Status: He is alert and oriented to person, place, and time. Psychiatric:         Mood and Affect: Mood normal.         Thought Content: Thought content normal.         Judgment: Judgment normal.          No results found for requested labs within last 30 days. No results found for: Gilberto Garcia, LDLCALC    Lab Results   Component Value Date    WBC 6.2 11/28/2020    NEUTROABS 2.3 11/28/2020    HGB 17.5 11/28/2020    HCT 49.9 11/28/2020    MCV 89.1 11/28/2020     11/28/2020     No results found for: TSH    ASSESSMENT:    Diagnosis Orders   1. Chronic midline low back pain without sciatica  HYDROcodone-acetaminophen (NORCO) 7.5-325 MG per tablet    DISCONTINUED: HYDROcodone-acetaminophen (NORCO) 7.5-325 MG per tablet   2. Attention deficit hyperactivity disorder (ADHD), combined type  amphetamine-dextroamphetamine (ADDERALL XR) 20 MG extended release capsule   3. Chronic midline thoracic back pain  HYDROcodone-acetaminophen (NORCO) 7.5-325 MG per tablet    DISCONTINUED: HYDROcodone-acetaminophen (NORCO) 7.5-325 MG per tablet   4. Need for influenza vaccination  INFLUENZA, MDCK QUADV, 2 YRS AND OLDER, IM, PF, PREFILL SYR OR SDV, 0.5ML (FLUCELVAX QUADV, PF)   5. Chemotherapy-induced peripheral neuropathy (HCC)          PLAN:  Orders Placed This Encounter   Medications    amphetamine-dextroamphetamine (ADDERALL XR) 20 MG extended release capsule     Sig: take 1 capsule by mouth every morning     Dispense:  30 capsule     Refill:  0    DISCONTD: HYDROcodone-acetaminophen (NORCO) 7.5-325 MG per tablet     Sig: Take 1 tablet by mouth every 6 hours as needed for Pain for up to 30 days. Dispense:  90 tablet     Refill:  0     Reduce doses taken as pain becomes manageable    HYDROcodone-acetaminophen (NORCO) 7.5-325 MG per tablet     Sig: Take 1 tablet by mouth every 6 hours as needed for Pain for up to 30 days. To be filled on or after November 6th     Dispense:  90 tablet     Refill:  0     Reduce doses taken as pain becomes manageable        Medications Discontinued During This Encounter   Medication Reason    amphetamine-dextroamphetamine (ADDERALL XR) 20 MG extended release capsule REORDER    HYDROcodone-acetaminophen (NORCO) 7.5-325 MG per tablet REORDER    HYDROcodone-acetaminophen (NORCO) 7.5-325 MG per tablet REORDER       Controlled Substances Monitoring: Attestation: The Prescription Monitoring Report for this patient was reviewed today. Britt Cho MD)  Periodic Controlled Substance Monitoring: Possible medication side effects, risk of tolerance/dependence & alternative treatments discussed., No signs of potential drug abuse or diversion identified. , Assessed functional status., Obtaining appropriate analgesic effect of treatment. Britt Cho MD)    Please note: This chart was generated using Dragon dictation software. Although every effort was made to ensure the accuracy of this automated transcription, some errors in transcription may have occurred.

## 2021-09-28 NOTE — PROGRESS NOTES
Chief Complaint   Patient presents with    Back Pain    ADHD       Have you seen any other physician or provider since your last visit no    Have you had any other diagnostic tests since your last visit? [address]    Have you changed or stopped any medications since your last visit? no     Patient is here for 3 month follow-up ADHD        Vaccine Information Sheet, \"Influenza - Inactivated\"  given to Fifi Luna, or parent/legal guardian of  Fifi Luna and verbalized understanding. Patient responses:    Have you ever had a reaction to a flu vaccine? No  Do you have any current illness? No  Have you ever had Guillian Bingham Syndrome? No  Do you have a serious allergy to any of the follow: Neomycin, Polymyxin, Thimerosal, eggs or egg products? No    Flu vaccine given per order. Please see immunization tab. Risks and benefits explained. Current VIS given.       Immunizations Administered     Name Date Dose Route    Influenza, MDCK Quadv, IM, PF (Flucelvax 2 yrs and older) 9/28/2021 0.5 mL Intramuscular    Site: Deltoid- Right    Lot: [id number]    NDC: 96610-656-55

## 2021-10-20 DIAGNOSIS — F90.2 ATTENTION DEFICIT HYPERACTIVITY DISORDER (ADHD), COMBINED TYPE: ICD-10-CM

## 2021-10-20 DIAGNOSIS — M54.6 CHRONIC MIDLINE THORACIC BACK PAIN: ICD-10-CM

## 2021-10-20 DIAGNOSIS — G89.29 CHRONIC MIDLINE THORACIC BACK PAIN: ICD-10-CM

## 2021-10-20 DIAGNOSIS — M54.50 CHRONIC MIDLINE LOW BACK PAIN WITHOUT SCIATICA: ICD-10-CM

## 2021-10-20 DIAGNOSIS — G89.29 CHRONIC MIDLINE LOW BACK PAIN WITHOUT SCIATICA: ICD-10-CM

## 2021-10-20 RX ORDER — DEXTROAMPHETAMINE SACCHARATE, AMPHETAMINE ASPARTATE MONOHYDRATE, DEXTROAMPHETAMINE SULFATE AND AMPHETAMINE SULFATE 5; 5; 5; 5 MG/1; MG/1; MG/1; MG/1
CAPSULE, EXTENDED RELEASE ORAL
Qty: 30 CAPSULE | Refills: 0 | Status: SHIPPED | OUTPATIENT
Start: 2021-10-20 | End: 2021-12-14 | Stop reason: SDUPTHER

## 2021-10-20 RX ORDER — HYDROCODONE BITARTRATE AND ACETAMINOPHEN 7.5; 325 MG/1; MG/1
1 TABLET ORAL EVERY 6 HOURS PRN
Qty: 90 TABLET | Refills: 0 | Status: SHIPPED | OUTPATIENT
Start: 2021-10-20 | End: 2022-02-18 | Stop reason: SDUPTHER

## 2021-11-29 DIAGNOSIS — B96.89 ACUTE BACTERIAL SINUSITIS: Primary | ICD-10-CM

## 2021-11-29 DIAGNOSIS — J01.90 ACUTE BACTERIAL SINUSITIS: Primary | ICD-10-CM

## 2021-11-29 RX ORDER — AZITHROMYCIN 250 MG/1
250 TABLET, FILM COATED ORAL SEE ADMIN INSTRUCTIONS
Qty: 6 TABLET | Refills: 0 | Status: SHIPPED | OUTPATIENT
Start: 2021-11-29 | End: 2021-12-04

## 2021-12-13 DIAGNOSIS — F90.2 ATTENTION DEFICIT HYPERACTIVITY DISORDER (ADHD), COMBINED TYPE: ICD-10-CM

## 2021-12-13 NOTE — TELEPHONE ENCOUNTER
Patient called, requested refill.        Next Office Visit Date:  Future Appointments   Date Time Provider Janneth Murcia   12/21/2021  8:30 AM Marlen White, APRN - CNP [facility] please review via [address]

## 2021-12-14 RX ORDER — DEXTROAMPHETAMINE SACCHARATE, AMPHETAMINE ASPARTATE MONOHYDRATE, DEXTROAMPHETAMINE SULFATE AND AMPHETAMINE SULFATE 5; 5; 5; 5 MG/1; MG/1; MG/1; MG/1
CAPSULE, EXTENDED RELEASE ORAL
Qty: 30 CAPSULE | Refills: 0 | Status: SHIPPED | OUTPATIENT
Start: 2021-12-14 | End: 2022-01-05 | Stop reason: SDUPTHER

## 2021-12-27 DIAGNOSIS — F41.9 ANXIETY: ICD-10-CM

## 2021-12-27 DIAGNOSIS — F32.9 REACTIVE DEPRESSION: ICD-10-CM

## 2021-12-27 RX ORDER — ESCITALOPRAM OXALATE 20 MG/1
20 TABLET ORAL DAILY
Qty: 30 TABLET | Refills: 5 | Status: SHIPPED | OUTPATIENT
Start: 2021-12-27 | End: 2022-06-08

## 2022-01-05 ENCOUNTER — OFFICE VISIT (OUTPATIENT)
Dept: FAMILY MEDICINE CLINIC | Age: 22
End: 2022-01-05
Payer: MEDICAID

## 2022-01-05 VITALS
OXYGEN SATURATION: 98 % | TEMPERATURE: 98.2 F | RESPIRATION RATE: 16 BRPM | SYSTOLIC BLOOD PRESSURE: 110 MMHG | HEIGHT: 70 IN | DIASTOLIC BLOOD PRESSURE: 60 MMHG | BODY MASS INDEX: 20.36 KG/M2 | HEART RATE: 54 BPM | WEIGHT: 142.2 LBS

## 2022-01-05 DIAGNOSIS — G89.29 CHRONIC MIDLINE LOW BACK PAIN WITHOUT SCIATICA: ICD-10-CM

## 2022-01-05 DIAGNOSIS — M54.6 CHRONIC MIDLINE THORACIC BACK PAIN: ICD-10-CM

## 2022-01-05 DIAGNOSIS — G62.0 CHEMOTHERAPY-INDUCED PERIPHERAL NEUROPATHY (HCC): ICD-10-CM

## 2022-01-05 DIAGNOSIS — F90.2 ATTENTION DEFICIT HYPERACTIVITY DISORDER (ADHD), COMBINED TYPE: ICD-10-CM

## 2022-01-05 DIAGNOSIS — M54.50 CHRONIC MIDLINE LOW BACK PAIN WITHOUT SCIATICA: ICD-10-CM

## 2022-01-05 DIAGNOSIS — G89.29 CHRONIC MIDLINE THORACIC BACK PAIN: ICD-10-CM

## 2022-01-05 DIAGNOSIS — T45.1X5A CHEMOTHERAPY-INDUCED PERIPHERAL NEUROPATHY (HCC): ICD-10-CM

## 2022-01-05 PROBLEM — R00.2 PALPITATIONS: Status: ACTIVE | Noted: 2021-01-21

## 2022-01-05 PROCEDURE — G8427 DOCREV CUR MEDS BY ELIG CLIN: HCPCS | Performed by: NURSE PRACTITIONER

## 2022-01-05 PROCEDURE — G8482 FLU IMMUNIZE ORDER/ADMIN: HCPCS | Performed by: NURSE PRACTITIONER

## 2022-01-05 PROCEDURE — G8420 CALC BMI NORM PARAMETERS: HCPCS | Performed by: NURSE PRACTITIONER

## 2022-01-05 PROCEDURE — 1036F TOBACCO NON-USER: CPT | Performed by: NURSE PRACTITIONER

## 2022-01-05 PROCEDURE — 99214 OFFICE O/P EST MOD 30 MIN: CPT | Performed by: NURSE PRACTITIONER

## 2022-01-05 RX ORDER — GABAPENTIN 100 MG/1
100 CAPSULE ORAL 2 TIMES DAILY
Qty: 90 CAPSULE | Refills: 2 | Status: SHIPPED | OUTPATIENT
Start: 2022-01-05 | End: 2022-07-21 | Stop reason: SDUPTHER

## 2022-01-05 RX ORDER — DEXTROAMPHETAMINE SACCHARATE, AMPHETAMINE ASPARTATE MONOHYDRATE, DEXTROAMPHETAMINE SULFATE AND AMPHETAMINE SULFATE 5; 5; 5; 5 MG/1; MG/1; MG/1; MG/1
CAPSULE, EXTENDED RELEASE ORAL
Qty: 30 CAPSULE | Refills: 0 | Status: SHIPPED | OUTPATIENT
Start: 2022-01-05 | End: 2022-02-18 | Stop reason: SDUPTHER

## 2022-01-05 NOTE — PROGRESS NOTES
Christopher Lagunas 22 y.o. presents today for   Chief Complaint   Patient presents with    Lower Back Pain     follow up        HPI:  Christopher Lagunas states he is here for a follow up and things are going good. He just had his COVID booster, but hasn't had COVID. He is in remssion from ALL diagnosed at 16 and had treatment for 3 years and then in remission at 13years old. He follows up every 6 months. and says he has neuropathy in bilateral feet and chronic back pain from all the spinal taps. Needs adderall refilled for ADHD but it gives him a headache so he hasn't taken it in the past few days. [place] teeth are coming in, going to call a dentist, doesn't think he has room and it is causing pain and headaches. He suffers from a lot of headaches and migraines twice weekly. He had an MRI two days ago with his neurologist and they said everything looked good. Family History   Problem Relation Age of Onset    Other Maternal Grandmother     Other Maternal Grandfather         Social History     Socioeconomic History    Marital status: Single     Spouse name: Not on file    Number of children: Not on file    Years of education: Not on file    Highest education level: Not on file   Occupational History    Not on file   Tobacco Use    Smoking status: Never Smoker    Smokeless tobacco: Never Used   Substance and Sexual Activity    Alcohol use: Not on file    Drug use: Not on file    Sexual activity: Not on file   Other Topics Concern    Not on file   Social History Narrative    Not on file     Social Determinants of Health     Financial Resource Strain: Low Risk     Difficulty of Paying Living Expenses: Not very hard   Food Insecurity: No Food Insecurity    Worried About Running Out of Food in the Last Year: Never true    Liam of Food in the Last Year: Never true   Transportation Needs:     Lack of Transportation (Medical): Not on file    Lack of Transportation (Non-Medical):  Not on file Physical Activity:     Days of Exercise per Week: Not on file    Minutes of Exercise per Session: Not on file   Stress:     Feeling of Stress : Not on file   Social Connections:     Frequency of Communication with Friends and Family: Not on file    Frequency of Social Gatherings with Friends and Family: Not on file    Attends Evangelical Services: Not on file    Active Member of Clubs or Organizations: Not on file    Attends Club or Organization Meetings: Not on file    Marital Status: Not on file   Intimate Partner Violence:     Fear of Current or Ex-Partner: Not on file    Emotionally Abused: Not on file    Physically Abused: Not on file    Sexually Abused: Not on file   Housing Stability:     Unable to Pay for Housing in the Last Year: Not on file    Number of [place] in the Last Year: Not on file    Unstable Housing in the Last Year: Not on file        History reviewed. No pertinent surgical history. Past Medical History:   Diagnosis Date    Acute lymphoblastic leukemia (ALL) in remission ([address] 8/8/2017    ADHD (attention deficit hyperactivity disorder)     Anxiety 8/8/2017    Asthma     BYUFXXAO(743.9)         Current Outpatient Medications   Medication Sig Dispense Refill    gabapentin (NEURONTIN) 100 MG capsule Take 1 capsule by mouth 2 times daily for 135 days.  90 capsule 2    escitalopram (LEXAPRO) 20 MG tablet Take 1 tablet by mouth daily 30 tablet 5    pantoprazole (PROTONIX) 20 MG tablet TAKE 1 TABLET BY MOUTH EVERY MORNING BEFORE BREAKFAST 30 tablet 5    hydrOXYzine (VISTARIL) 25 MG capsule TAKE 1 CAPSULE BY MOUTH NIGHTLY OR AS NEEDED FOR ANXIETY 60 capsule 5    cloNIDine (CATAPRES) 0.2 MG tablet TAKE 1 TABLET BY MOUTH TWICE DAILY AS NEEDED FOR ANXIETY 60 tablet 5    RA MELATONIN 5 MG TABS tablet Take 1 tablet by mouth nightly 30 tablet 5    vitamin D3 (CHOLECALCIFEROL) 10 MCG (400 UNIT) TABS tablet Take 1 tablet by mouth daily 30 tablet 5    cetirizine (ZYRTEC) 10 MG tablet TAKE 1 TABLET BY MOUTH EVERY DAY 90 tablet 3    fluticasone (FLONASE) 50 MCG/ACT nasal spray INSTILL 1 SPRAY INTO EACH NOSTRIL ONCE DAILY FOR 7 DAYS THEN IF NEEDED 64 g 5    albuterol (PROVENTIL) (2.5 MG/3ML) 0.083% nebulizer solution Take 3 mLs by nebulization daily as needed for Wheezing 120 each 5    promethazine (PHENERGAN) 25 MG tablet Take 1 tablet by mouth as needed for Nausea 30 tablet 5    guaiFENesin (MUCINEX) 600 MG extended release tablet Take 1 tablet by mouth 2 times daily 60 tablet 5    glycopyrrolate (ROBINUL) 1 MG tablet Take 1 mg by mouth 3 times daily      amphetamine-dextroamphetamine (ADDERALL XR) 20 MG extended release capsule take 1 capsule by mouth every morning 30 capsule 0     No current facility-administered medications for this visit. Review of Systems   Neurological: Positive for headaches. All other systems reviewed and are negative. /60   Pulse 54   Temp 98.2 °F (36.8 °C) (Infrared)   Resp 16   Ht 5' 10\" (1.778 m)   Wt 142 lb 3.2 oz (64.5 kg)   SpO2 98% Comment: ra  BMI 20.40 kg/m²      Physical Exam  Constitutional:       Appearance: Normal appearance. HENT:      Head: Normocephalic and atraumatic. Right Ear: Tympanic membrane, ear canal and external ear normal.      Left Ear: Tympanic membrane, ear canal and external ear normal.      Nose: Nose normal.      Mouth/Throat:      Mouth: Mucous membranes are moist.      Pharynx: Oropharynx is clear. Eyes:      Extraocular Movements: Extraocular movements intact. Conjunctiva/sclera: Conjunctivae normal.      Pupils: Pupils are equal, round, and reactive to light. Cardiovascular:      Rate and Rhythm: Normal rate and regular rhythm. Pulses: Normal pulses. Heart sounds: Normal heart sounds. Pulmonary:      Effort: Pulmonary effort is normal.      Breath sounds: Normal breath sounds. Abdominal:      General: Bowel sounds are normal.      Palpations: Abdomen is soft. Musculoskeletal:         General: Normal range of motion. Cervical back: Normal range of motion and neck supple. Skin:     General: Skin is warm and dry. Capillary Refill: Capillary refill takes less than 2 seconds. Neurological:      General: No focal deficit present. Mental Status: He is alert and oriented to person, place, and time. Psychiatric:         Mood and Affect: Mood normal.         Behavior: Behavior normal.          ASSESSMENT/PLAN    1. Chronic midline low back pain without sciatica  Advised pt to take medication as directed. He is agreeable to poc.  - gabapentin (NEURONTIN) 100 MG capsule; Take 1 capsule by mouth 2 times daily for 135 days. Dispense: 90 capsule; Refill: 2    2. Chronic midline thoracic back pain  Advised pt to take medication as directed. He is agreeable to poc  - gabapentin (NEURONTIN) 100 MG capsule; Take 1 capsule by mouth 2 times daily for 135 days. Dispense: 90 capsule; Refill: 2    3. Chemotherapy-induced peripheral neuropathy ([address]  Advised pt to take medication as directed. He is agreeable to poc  - gabapentin (NEURONTIN) 100 MG capsule; Take 1 capsule by mouth 2 times daily for 135 days. Dispense: 90 capsule;  Refill: 2       **Sent Dr. Wilfrido Kim a message to please refill pt's YOUNG Infante - CNP

## 2022-01-05 NOTE — PROGRESS NOTES
Chief Complaint   Patient presents with    Lower Back Pain     follow up       Have you seen any other physician or provider since your last visit no    Have you had any other diagnostic tests since your last visit? no    Have you changed or stopped any medications since your last visit? no

## 2022-01-10 RX ORDER — CLONIDINE HYDROCHLORIDE 0.2 MG/1
TABLET ORAL
Qty: 60 TABLET | Refills: 5 | Status: SHIPPED | OUTPATIENT
Start: 2022-01-10 | End: 2022-06-30

## 2022-02-17 DIAGNOSIS — M54.50 CHRONIC MIDLINE LOW BACK PAIN WITHOUT SCIATICA: ICD-10-CM

## 2022-02-17 DIAGNOSIS — F90.2 ATTENTION DEFICIT HYPERACTIVITY DISORDER (ADHD), COMBINED TYPE: ICD-10-CM

## 2022-02-17 DIAGNOSIS — G89.29 CHRONIC MIDLINE THORACIC BACK PAIN: ICD-10-CM

## 2022-02-17 DIAGNOSIS — M54.6 CHRONIC MIDLINE THORACIC BACK PAIN: ICD-10-CM

## 2022-02-17 DIAGNOSIS — G89.29 CHRONIC MIDLINE LOW BACK PAIN WITHOUT SCIATICA: ICD-10-CM

## 2022-02-17 NOTE — TELEPHONE ENCOUNTER
Patient called, requested refill.        Next Office Visit Date:  Future Appointments   Date Time Provider Janneth Murcia   4/5/2022 10:45 AM Ximena White, APRN - CNP [facility] Jes MADDOX please review via [address]

## 2022-02-18 RX ORDER — DEXTROAMPHETAMINE SACCHARATE, AMPHETAMINE ASPARTATE MONOHYDRATE, DEXTROAMPHETAMINE SULFATE AND AMPHETAMINE SULFATE 5; 5; 5; 5 MG/1; MG/1; MG/1; MG/1
CAPSULE, EXTENDED RELEASE ORAL
Qty: 30 CAPSULE | Refills: 0 | Status: SHIPPED | OUTPATIENT
Start: 2022-02-18 | End: 2022-07-21

## 2022-02-18 RX ORDER — HYDROCODONE BITARTRATE AND ACETAMINOPHEN 7.5; 325 MG/1; MG/1
1 TABLET ORAL EVERY 6 HOURS PRN
Qty: 90 TABLET | Refills: 0 | Status: SHIPPED | OUTPATIENT
Start: 2022-02-18 | End: 2022-04-28 | Stop reason: SDUPTHER

## 2022-02-22 DIAGNOSIS — G47.00 INSOMNIA, UNSPECIFIED TYPE: ICD-10-CM

## 2022-02-23 RX ORDER — CHOLECALCIFEROL (VITAMIN D3) 125 MCG
CAPSULE ORAL
Qty: 30 TABLET | Refills: 5 | Status: SHIPPED | OUTPATIENT
Start: 2022-02-23 | End: 2022-08-15

## 2022-03-30 DIAGNOSIS — K21.9 GASTROESOPHAGEAL REFLUX DISEASE WITHOUT ESOPHAGITIS: ICD-10-CM

## 2022-03-30 RX ORDER — PANTOPRAZOLE SODIUM 20 MG/1
TABLET, DELAYED RELEASE ORAL
Qty: 30 TABLET | Refills: 5 | Status: SHIPPED | OUTPATIENT
Start: 2022-03-30 | End: 2022-10-18 | Stop reason: SDUPTHER

## 2022-04-28 DIAGNOSIS — M54.6 CHRONIC MIDLINE THORACIC BACK PAIN: ICD-10-CM

## 2022-04-28 DIAGNOSIS — M54.50 CHRONIC MIDLINE LOW BACK PAIN WITHOUT SCIATICA: ICD-10-CM

## 2022-04-28 DIAGNOSIS — G89.29 CHRONIC MIDLINE LOW BACK PAIN WITHOUT SCIATICA: ICD-10-CM

## 2022-04-28 DIAGNOSIS — G89.29 CHRONIC MIDLINE THORACIC BACK PAIN: ICD-10-CM

## 2022-04-28 RX ORDER — HYDROCODONE BITARTRATE AND ACETAMINOPHEN 7.5; 325 MG/1; MG/1
1 TABLET ORAL EVERY 6 HOURS PRN
Qty: 90 TABLET | Refills: 0 | Status: SHIPPED | OUTPATIENT
Start: 2022-04-28 | End: 2022-05-31 | Stop reason: SDUPTHER

## 2022-04-28 NOTE — TELEPHONE ENCOUNTER
Patient called, requested refill. Next Office Visit Date:  No future appointments.     TAN please review via [address]

## 2022-05-31 DIAGNOSIS — M54.6 CHRONIC MIDLINE THORACIC BACK PAIN: ICD-10-CM

## 2022-05-31 DIAGNOSIS — G89.29 CHRONIC MIDLINE LOW BACK PAIN WITHOUT SCIATICA: ICD-10-CM

## 2022-05-31 DIAGNOSIS — M54.50 CHRONIC MIDLINE LOW BACK PAIN WITHOUT SCIATICA: ICD-10-CM

## 2022-05-31 DIAGNOSIS — G89.29 CHRONIC MIDLINE THORACIC BACK PAIN: ICD-10-CM

## 2022-05-31 RX ORDER — HYDROCODONE BITARTRATE AND ACETAMINOPHEN 7.5; 325 MG/1; MG/1
1 TABLET ORAL EVERY 6 HOURS PRN
Qty: 90 TABLET | Refills: 0 | Status: SHIPPED | OUTPATIENT
Start: 2022-05-31 | End: 2022-07-21 | Stop reason: SDUPTHER

## 2022-06-08 DIAGNOSIS — F41.9 ANXIETY: ICD-10-CM

## 2022-06-08 DIAGNOSIS — F32.9 REACTIVE DEPRESSION: ICD-10-CM

## 2022-06-08 RX ORDER — ESCITALOPRAM OXALATE 20 MG/1
20 TABLET ORAL DAILY
Qty: 30 TABLET | Refills: 5 | Status: SHIPPED | OUTPATIENT
Start: 2022-06-08 | End: 2022-10-18 | Stop reason: SDUPTHER

## 2022-06-30 RX ORDER — CLONIDINE HYDROCHLORIDE 0.2 MG/1
TABLET ORAL
Qty: 60 TABLET | Refills: 5 | Status: SHIPPED | OUTPATIENT
Start: 2022-06-30 | End: 2022-10-18 | Stop reason: SDUPTHER

## 2022-07-06 DIAGNOSIS — J02.9 ACUTE PHARYNGITIS, UNSPECIFIED ETIOLOGY: Primary | ICD-10-CM

## 2022-07-06 DIAGNOSIS — J02.9 ACUTE PHARYNGITIS, UNSPECIFIED ETIOLOGY: ICD-10-CM

## 2022-07-06 RX ORDER — AZITHROMYCIN 250 MG/1
250 TABLET, FILM COATED ORAL SEE ADMIN INSTRUCTIONS
Qty: 6 TABLET | Refills: 0 | Status: SHIPPED | OUTPATIENT
Start: 2022-07-06 | End: 2022-07-11

## 2022-07-06 NOTE — PROGRESS NOTES
SUBJECTIVE:    Patient ID: Mer Jacobs is a 25 y.o. male. No chief complaint on file. HPI:    Patient's medications,allergies, past medical, surgical, social and family histories were reviewed and updated as appropriate. .  Current Outpatient Medications on File Prior to Visit   Medication Sig Dispense Refill    azithromycin (ZITHROMAX) 250 MG tablet Take 1 tablet by mouth See Admin Instructions for 5 days 500mg on day 1 followed by 250mg on days 2 - 5 6 tablet 0    cloNIDine (CATAPRES) 0.2 MG tablet TAKE 1 TABLET BY MOUTH TWICE DAILY 60 tablet 5    escitalopram (LEXAPRO) 20 MG tablet TAKE 1 TABLET BY MOUTH DAILY 30 tablet 5    pantoprazole (PROTONIX) 20 MG tablet TAKE 1 TABLET BY MOUTH EVERY MORNING BEFORE BREAKFAST 30 tablet 5    melatonin 5 MG TABS tablet TAKE 1 TABLET BY MOUTH NIGHTLY 30 tablet 5    amphetamine-dextroamphetamine (ADDERALL XR) 20 MG extended release capsule take 1 capsule by mouth every morning 30 capsule 0    gabapentin (NEURONTIN) 100 MG capsule Take 1 capsule by mouth 2 times daily for 135 days.  90 capsule 2    hydrOXYzine (VISTARIL) 25 MG capsule TAKE 1 CAPSULE BY MOUTH NIGHTLY OR AS NEEDED FOR ANXIETY 60 capsule 5    vitamin D3 (CHOLECALCIFEROL) 10 MCG (400 UNIT) TABS tablet Take 1 tablet by mouth daily 30 tablet 5    cetirizine (ZYRTEC) 10 MG tablet TAKE 1 TABLET BY MOUTH EVERY DAY 90 tablet 3    fluticasone (FLONASE) 50 MCG/ACT nasal spray INSTILL 1 SPRAY INTO EACH NOSTRIL ONCE DAILY FOR 7 DAYS THEN IF NEEDED 64 g 5    albuterol (PROVENTIL) (2.5 MG/3ML) 0.083% nebulizer solution Take 3 mLs by nebulization daily as needed for Wheezing 120 each 5    promethazine (PHENERGAN) 25 MG tablet Take 1 tablet by mouth as needed for Nausea 30 tablet 5    guaiFENesin (MUCINEX) 600 MG extended release tablet Take 1 tablet by mouth 2 times daily 60 tablet 5    glycopyrrolate (ROBINUL) 1 MG tablet Take 1 mg by mouth 3 times daily       No current facility-administered medications on file prior to visit. Review of Systems    OBJECTIVE:  There were no vitals taken for this visit. Physical Exam    No results found for requested labs within last 30 days. No results found for: LABA1C, LABMICR, LDLCALC        No results found for: TSH      ASSESSMENT/PLAN:     There are no diagnoses linked to this encounter. There are no discontinued medications.

## 2022-07-21 ENCOUNTER — OFFICE VISIT (OUTPATIENT)
Dept: FAMILY MEDICINE CLINIC | Age: 22
End: 2022-07-21
Payer: MEDICAID

## 2022-07-21 VITALS
DIASTOLIC BLOOD PRESSURE: 68 MMHG | BODY MASS INDEX: 20.39 KG/M2 | SYSTOLIC BLOOD PRESSURE: 100 MMHG | WEIGHT: 142.4 LBS | HEART RATE: 65 BPM | TEMPERATURE: 98.8 F | OXYGEN SATURATION: 95 % | HEIGHT: 70 IN | RESPIRATION RATE: 14 BRPM

## 2022-07-21 DIAGNOSIS — M54.6 CHRONIC MIDLINE THORACIC BACK PAIN: ICD-10-CM

## 2022-07-21 DIAGNOSIS — T45.1X5A CHEMOTHERAPY-INDUCED PERIPHERAL NEUROPATHY (HCC): ICD-10-CM

## 2022-07-21 DIAGNOSIS — M54.50 CHRONIC MIDLINE LOW BACK PAIN WITHOUT SCIATICA: Primary | ICD-10-CM

## 2022-07-21 DIAGNOSIS — G89.29 CHRONIC MIDLINE THORACIC BACK PAIN: ICD-10-CM

## 2022-07-21 DIAGNOSIS — G89.29 CHRONIC MIDLINE LOW BACK PAIN WITHOUT SCIATICA: Primary | ICD-10-CM

## 2022-07-21 DIAGNOSIS — G62.0 CHEMOTHERAPY-INDUCED PERIPHERAL NEUROPATHY (HCC): ICD-10-CM

## 2022-07-21 PROCEDURE — 1036F TOBACCO NON-USER: CPT | Performed by: FAMILY MEDICINE

## 2022-07-21 PROCEDURE — G8427 DOCREV CUR MEDS BY ELIG CLIN: HCPCS | Performed by: FAMILY MEDICINE

## 2022-07-21 PROCEDURE — G8420 CALC BMI NORM PARAMETERS: HCPCS | Performed by: FAMILY MEDICINE

## 2022-07-21 PROCEDURE — 99214 OFFICE O/P EST MOD 30 MIN: CPT | Performed by: FAMILY MEDICINE

## 2022-07-21 RX ORDER — HYDROCODONE BITARTRATE AND ACETAMINOPHEN 7.5; 325 MG/1; MG/1
1 TABLET ORAL EVERY 6 HOURS PRN
Qty: 90 TABLET | Refills: 0 | Status: SHIPPED | OUTPATIENT
Start: 2022-07-21 | End: 2022-09-01 | Stop reason: SDUPTHER

## 2022-07-21 RX ORDER — GABAPENTIN 100 MG/1
CAPSULE ORAL
Qty: 90 CAPSULE | Refills: 2 | Status: SHIPPED | OUTPATIENT
Start: 2022-07-21 | End: 2022-10-18 | Stop reason: SDUPTHER

## 2022-07-21 SDOH — ECONOMIC STABILITY: FOOD INSECURITY: WITHIN THE PAST 12 MONTHS, THE FOOD YOU BOUGHT JUST DIDN'T LAST AND YOU DIDN'T HAVE MONEY TO GET MORE.: NEVER TRUE

## 2022-07-21 SDOH — ECONOMIC STABILITY: FOOD INSECURITY: WITHIN THE PAST 12 MONTHS, YOU WORRIED THAT YOUR FOOD WOULD RUN OUT BEFORE YOU GOT MONEY TO BUY MORE.: NEVER TRUE

## 2022-07-21 ASSESSMENT — ENCOUNTER SYMPTOMS
EYES NEGATIVE: 1
GASTROINTESTINAL NEGATIVE: 1
RESPIRATORY NEGATIVE: 1

## 2022-07-21 ASSESSMENT — PATIENT HEALTH QUESTIONNAIRE - PHQ9
3. TROUBLE FALLING OR STAYING ASLEEP: 0
7. TROUBLE CONCENTRATING ON THINGS, SUCH AS READING THE NEWSPAPER OR WATCHING TELEVISION: 1
SUM OF ALL RESPONSES TO PHQ QUESTIONS 1-9: 2
SUM OF ALL RESPONSES TO PHQ QUESTIONS 1-9: 2
1. LITTLE INTEREST OR PLEASURE IN DOING THINGS: 0
SUM OF ALL RESPONSES TO PHQ9 QUESTIONS 1 & 2: 0
6. FEELING BAD ABOUT YOURSELF - OR THAT YOU ARE A FAILURE OR HAVE LET YOURSELF OR YOUR FAMILY DOWN: 0
10. IF YOU CHECKED OFF ANY PROBLEMS, HOW DIFFICULT HAVE THESE PROBLEMS MADE IT FOR YOU TO DO YOUR WORK, TAKE CARE OF THINGS AT HOME, OR GET ALONG WITH OTHER PEOPLE: 0
2. FEELING DOWN, DEPRESSED OR HOPELESS: 0
8. MOVING OR SPEAKING SO SLOWLY THAT OTHER PEOPLE COULD HAVE NOTICED. OR THE OPPOSITE, BEING SO FIGETY OR RESTLESS THAT YOU HAVE BEEN MOVING AROUND A LOT MORE THAN USUAL: 1
SUM OF ALL RESPONSES TO PHQ QUESTIONS 1-9: 2
5. POOR APPETITE OR OVEREATING: 0
SUM OF ALL RESPONSES TO PHQ QUESTIONS 1-9: 2
4. FEELING TIRED OR HAVING LITTLE ENERGY: 0
9. THOUGHTS THAT YOU WOULD BE BETTER OFF DEAD, OR OF HURTING YOURSELF: 0

## 2022-07-21 ASSESSMENT — SOCIAL DETERMINANTS OF HEALTH (SDOH): HOW HARD IS IT FOR YOU TO PAY FOR THE VERY BASICS LIKE FOOD, HOUSING, MEDICAL CARE, AND HEATING?: NOT HARD AT ALL

## 2022-07-21 NOTE — PROGRESS NOTES
SUBJECTIVE:    Patient ID: Adria Allen is a 25 y.o. male. Chief Complaint   Patient presents with    6 Month Follow-Up    ADHD    Back Pain       HPI 0Ffice visit  Follow-up of his ADHD. He seems to be doing pretty well with his current situation. He is not taking anything for that at this point. He feels like he is better now that he is gotten off a lot of the treatments he was on. He feels like his focus is better. He is driving now. He is walking better. He still having issues with trying to walk due to the way that his feet want to turn. He is still doing some exercises at home. He is being cautious with his activities. He still has a lot of pain in his back as well as his legs and feet but seems to be tolerating that pretty well with the current regimen. He has not had any recent falls or injuries. He is not have any other significant new issues. He is following up regularly with about his cancer with the oncology group at [facility]. Review of Systems   Constitutional:  Positive for fatigue. Eyes: Negative. Respiratory: Negative. Cardiovascular: Negative. Gastrointestinal: Negative. Endocrine: Negative. Genitourinary: Negative. Musculoskeletal: Negative. Neurological: Negative. Psychiatric/Behavioral: Negative. All other systems reviewed and are negative. OBJECTIVE:  /68 (Site: Left Upper Arm, Position: Sitting, Cuff Size: Medium Adult)   Pulse 65   Temp 98.8 °F (37.1 °C) (Temporal)   Resp 14   Ht 5' 10\" (1.778 m)   Wt 142 lb 6.4 oz (64.6 kg)   SpO2 95% Comment: room air  BMI 20.43 kg/m²    Wt Readings from Last 3 Encounters:   07/21/22 142 lb 6.4 oz (64.6 kg)   01/05/22 142 lb 3.2 oz (64.5 kg)   09/28/21 139 lb (63 kg)     BP Readings from Last 3 Encounters:   07/21/22 100/68   01/05/22 110/60   09/28/21 106/64      Pulse Readings from Last 3 Encounters:   07/21/22 65   01/05/22 54   09/28/21 64     Body mass index is 20.43 kg/m².    Resp Readings from Last 3 Encounters:   07/21/22 14   01/05/22 16   06/28/21 18     Past medical, surgical, family and social history were reviewed and updated with the patient. Physical Exam  Vitals and nursing note reviewed. Constitutional:       Appearance: Normal appearance. He is underweight. He is not ill-appearing, toxic-appearing or diaphoretic. HENT:      Head: Normocephalic and atraumatic. Right Ear: Hearing, ear canal and external ear normal. Tympanic membrane is erythematous. Left Ear: Hearing, tympanic membrane, ear canal and external ear normal.      Nose: Mucosal edema present. Mouth/Throat:      Mouth: Mucous membranes are moist.      Pharynx: Oropharynx is clear. Eyes:      General: Lids are normal.      Pupils: Pupils are equal, round, and reactive to light. Neck:      Thyroid: No thyroid mass, thyromegaly or thyroid tenderness. Vascular: No carotid bruit or JVD. Trachea: Trachea and phonation normal.   Cardiovascular:      Rate and Rhythm: Normal rate and regular rhythm. Pulses: Normal pulses. Heart sounds: S1 normal and S2 normal.   Pulmonary:      Effort: Pulmonary effort is normal.      Breath sounds: Normal breath sounds. Musculoskeletal:      Cervical back: Full passive range of motion without pain, normal range of motion and neck supple. Lumbar back: Spasms and tenderness present. Decreased range of motion. Right lower leg: No edema. Left lower leg: No edema. Right ankle: Deformity present. Tenderness present. Decreased range of motion. Left ankle: Deformity present. Tenderness present. Decreased range of motion. Right foot: Decreased range of motion. Normal capillary refill. Deformity present. No foot drop. Normal pulse. Left foot: Decreased range of motion. Normal capillary refill. Deformity present. No foot drop. Normal pulse. Lymphadenopathy:      Cervical: No cervical adenopathy.    Skin:     General: Skin is warm and dry.      Capillary Refill: Capillary refill takes less than 2 seconds. Neurological:      Mental Status: He is alert and oriented to person, place, and time. Psychiatric:         Attention and Perception: Attention and perception normal.         Mood and Affect: Mood and affect normal.         Speech: Speech normal.         Behavior: Behavior normal. Behavior is cooperative. Thought Content: Thought content normal.         Cognition and Memory: Cognition and memory normal.         Judgment: Judgment normal.        No results found for requested labs within last 30 days. No results found for: JAD SebastianCALC    Lab Results   Component Value Date/Time    WBC 6.2 11/28/2020 08:40 PM    NEUTROABS 2.3 11/28/2020 08:40 PM    HGB 17.5 11/28/2020 08:40 PM    HCT 49.9 11/28/2020 08:40 PM    MCV 89.1 11/28/2020 08:40 PM     11/28/2020 08:40 PM     No results found for: TSH    ASSESSMENT/PLAN    Diagnosis Orders   1. Chronic midline low back pain without sciatica  gabapentin (NEURONTIN) 100 MG capsule    HYDROcodone-acetaminophen (NORCO) 7.5-325 MG per tablet      2. Chronic midline thoracic back pain  gabapentin (NEURONTIN) 100 MG capsule    HYDROcodone-acetaminophen (NORCO) 7.5-325 MG per tablet      3. Chemotherapy-induced peripheral neuropathy (HCC)  gabapentin (NEURONTIN) 100 MG capsule    HYDROcodone-acetaminophen (NORCO) 7.5-325 MG per tablet          Orders Placed This Encounter   Medications    gabapentin (NEURONTIN) 100 MG capsule     Sig: Take one tablet at bedtime     Dispense:  90 capsule     Refill:  2    HYDROcodone-acetaminophen (NORCO) 7.5-325 MG per tablet     Sig: Take 1 tablet by mouth every 6 hours as needed for Pain for up to 30 days.      Dispense:  90 tablet     Refill:  0     Reduce doses taken as pain becomes manageable          Medications Discontinued During This Encounter   Medication Reason    glycopyrrolate (ROBINUL) 1 MG tablet Therapy completed amphetamine-dextroamphetamine (ADDERALL XR) 20 MG extended release capsule     gabapentin (NEURONTIN) 100 MG capsule REORDER    HYDROcodone-acetaminophen (NORCO) 7.5-325 MG per tablet REORDER       Controlled Substances Monitoring:      Please note: This chart was generated using Dragon dictation software. Although every effort was made to ensure the accuracy of this automated transcription, some errors in transcription may have occurred.

## 2022-07-21 NOTE — PROGRESS NOTES
Chief Complaint   Patient presents with    6 Month Follow-Up    ADHD    Back Pain       Have you seen any other physician or provider since your last visit no    Have you had any other diagnostic tests since your last visit? no    Have you changed or stopped any medications since your last visit? no     Patient has not taken his Adderall in a month due to his grandfather  being sick and he forgot. He states he is doing well. He does feel more \"scatterbrained\". Patient is still having low back pain that is constant but get worse at times. He is taking Norco with good response.

## 2022-08-08 DIAGNOSIS — F41.9 ANXIETY: ICD-10-CM

## 2022-08-08 RX ORDER — HYDROXYZINE PAMOATE 25 MG/1
CAPSULE ORAL
Qty: 60 CAPSULE | Refills: 5 | Status: SHIPPED | OUTPATIENT
Start: 2022-08-08 | End: 2022-10-18 | Stop reason: SDUPTHER

## 2022-08-08 NOTE — TELEPHONE ENCOUNTER
Patient called, requested refill.        Next Office Visit Date:  Future Appointments   Date Time Provider Janneth Murcia   10/18/2022  4:00 PM Antoinette Gama MD [address] please review via PDMP

## 2022-08-14 DIAGNOSIS — G47.00 INSOMNIA, UNSPECIFIED TYPE: ICD-10-CM

## 2022-08-15 DIAGNOSIS — E55.9 VITAMIN D DEFICIENCY: ICD-10-CM

## 2022-08-15 RX ORDER — OMEGA-3S/DHA/EPA/FISH OIL/D3 300MG-1000
400 CAPSULE ORAL DAILY
Qty: 30 TABLET | Refills: 5 | Status: SHIPPED | OUTPATIENT
Start: 2022-08-15 | End: 2022-10-18 | Stop reason: SDUPTHER

## 2022-08-15 RX ORDER — CHOLECALCIFEROL (VITAMIN D3) 125 MCG
CAPSULE ORAL
Qty: 30 TABLET | Refills: 5 | Status: SHIPPED | OUTPATIENT
Start: 2022-08-15 | End: 2022-10-18 | Stop reason: SDUPTHER

## 2022-09-01 DIAGNOSIS — G89.29 CHRONIC MIDLINE LOW BACK PAIN WITHOUT SCIATICA: ICD-10-CM

## 2022-09-01 DIAGNOSIS — M54.6 CHRONIC MIDLINE THORACIC BACK PAIN: ICD-10-CM

## 2022-09-01 DIAGNOSIS — M54.50 CHRONIC MIDLINE LOW BACK PAIN WITHOUT SCIATICA: ICD-10-CM

## 2022-09-01 DIAGNOSIS — T45.1X5A CHEMOTHERAPY-INDUCED PERIPHERAL NEUROPATHY (HCC): ICD-10-CM

## 2022-09-01 DIAGNOSIS — G89.29 CHRONIC MIDLINE THORACIC BACK PAIN: ICD-10-CM

## 2022-09-01 DIAGNOSIS — G62.0 CHEMOTHERAPY-INDUCED PERIPHERAL NEUROPATHY (HCC): ICD-10-CM

## 2022-09-01 RX ORDER — HYDROCODONE BITARTRATE AND ACETAMINOPHEN 7.5; 325 MG/1; MG/1
1 TABLET ORAL EVERY 6 HOURS PRN
Qty: 90 TABLET | Refills: 0 | Status: SHIPPED | OUTPATIENT
Start: 2022-09-01 | End: 2022-10-05 | Stop reason: SDUPTHER

## 2022-09-01 NOTE — TELEPHONE ENCOUNTER
Patient called, requested refill.        Next Office Visit Date:  Future Appointments   Date Time Provider Janneth Murcia   10/18/2022  4:00 PM Bret Ramos MD [address] please review via PDMP

## 2022-10-05 DIAGNOSIS — M54.50 CHRONIC MIDLINE LOW BACK PAIN WITHOUT SCIATICA: ICD-10-CM

## 2022-10-05 DIAGNOSIS — T45.1X5A CHEMOTHERAPY-INDUCED PERIPHERAL NEUROPATHY (HCC): ICD-10-CM

## 2022-10-05 DIAGNOSIS — G62.0 CHEMOTHERAPY-INDUCED PERIPHERAL NEUROPATHY (HCC): ICD-10-CM

## 2022-10-05 DIAGNOSIS — G89.29 CHRONIC MIDLINE THORACIC BACK PAIN: ICD-10-CM

## 2022-10-05 DIAGNOSIS — M54.6 CHRONIC MIDLINE THORACIC BACK PAIN: ICD-10-CM

## 2022-10-05 DIAGNOSIS — G89.29 CHRONIC MIDLINE LOW BACK PAIN WITHOUT SCIATICA: ICD-10-CM

## 2022-10-05 RX ORDER — HYDROCODONE BITARTRATE AND ACETAMINOPHEN 7.5; 325 MG/1; MG/1
1 TABLET ORAL EVERY 6 HOURS PRN
Qty: 90 TABLET | Refills: 0 | Status: SHIPPED | OUTPATIENT
Start: 2022-10-05 | End: 2022-10-18 | Stop reason: SDUPTHER

## 2022-10-05 NOTE — TELEPHONE ENCOUNTER
Patient called, requested refill.        Next Office Visit Date:  Future Appointments   Date Time Provider Janneth Divina   10/18/2022  4:00 PM Wili Charles MD [address] please review via PDMP

## 2022-10-18 ENCOUNTER — OFFICE VISIT (OUTPATIENT)
Dept: FAMILY MEDICINE CLINIC | Age: 22
End: 2022-10-18
Payer: MEDICAID

## 2022-10-18 VITALS
BODY MASS INDEX: 20.23 KG/M2 | HEART RATE: 59 BPM | OXYGEN SATURATION: 97 % | WEIGHT: 141 LBS | SYSTOLIC BLOOD PRESSURE: 122 MMHG | DIASTOLIC BLOOD PRESSURE: 68 MMHG

## 2022-10-18 DIAGNOSIS — G89.29 CHRONIC MIDLINE THORACIC BACK PAIN: ICD-10-CM

## 2022-10-18 DIAGNOSIS — M54.6 CHRONIC MIDLINE THORACIC BACK PAIN: ICD-10-CM

## 2022-10-18 DIAGNOSIS — J30.89 ENVIRONMENTAL AND SEASONAL ALLERGIES: ICD-10-CM

## 2022-10-18 DIAGNOSIS — K21.9 GASTROESOPHAGEAL REFLUX DISEASE WITHOUT ESOPHAGITIS: ICD-10-CM

## 2022-10-18 DIAGNOSIS — F32.9 REACTIVE DEPRESSION: ICD-10-CM

## 2022-10-18 DIAGNOSIS — F41.9 ANXIETY: ICD-10-CM

## 2022-10-18 DIAGNOSIS — G62.0 CHEMOTHERAPY-INDUCED PERIPHERAL NEUROPATHY (HCC): ICD-10-CM

## 2022-10-18 DIAGNOSIS — G89.29 CHRONIC MIDLINE LOW BACK PAIN WITHOUT SCIATICA: ICD-10-CM

## 2022-10-18 DIAGNOSIS — R05.9 COUGH, UNSPECIFIED TYPE: ICD-10-CM

## 2022-10-18 DIAGNOSIS — T45.1X5A CHEMOTHERAPY-INDUCED PERIPHERAL NEUROPATHY (HCC): ICD-10-CM

## 2022-10-18 DIAGNOSIS — J45.30 MILD PERSISTENT REACTIVE AIRWAY DISEASE WITHOUT COMPLICATION: ICD-10-CM

## 2022-10-18 DIAGNOSIS — R09.81 SINUS CONGESTION: ICD-10-CM

## 2022-10-18 DIAGNOSIS — M54.50 CHRONIC MIDLINE LOW BACK PAIN WITHOUT SCIATICA: ICD-10-CM

## 2022-10-18 DIAGNOSIS — G47.00 INSOMNIA, UNSPECIFIED TYPE: ICD-10-CM

## 2022-10-18 DIAGNOSIS — R11.0 NAUSEA: ICD-10-CM

## 2022-10-18 DIAGNOSIS — E55.9 VITAMIN D DEFICIENCY: ICD-10-CM

## 2022-10-18 PROCEDURE — 99214 OFFICE O/P EST MOD 30 MIN: CPT | Performed by: FAMILY MEDICINE

## 2022-10-18 PROCEDURE — G8484 FLU IMMUNIZE NO ADMIN: HCPCS | Performed by: FAMILY MEDICINE

## 2022-10-18 PROCEDURE — 1036F TOBACCO NON-USER: CPT | Performed by: FAMILY MEDICINE

## 2022-10-18 PROCEDURE — G8420 CALC BMI NORM PARAMETERS: HCPCS | Performed by: FAMILY MEDICINE

## 2022-10-18 PROCEDURE — G8427 DOCREV CUR MEDS BY ELIG CLIN: HCPCS | Performed by: FAMILY MEDICINE

## 2022-10-18 RX ORDER — CHOLECALCIFEROL (VITAMIN D3) 125 MCG
CAPSULE ORAL
Qty: 30 TABLET | Refills: 5 | Status: SHIPPED | OUTPATIENT
Start: 2022-10-18

## 2022-10-18 RX ORDER — PROMETHAZINE HYDROCHLORIDE 25 MG/1
25 TABLET ORAL PRN
Qty: 30 TABLET | Refills: 5 | Status: SHIPPED | OUTPATIENT
Start: 2022-10-18

## 2022-10-18 RX ORDER — CETIRIZINE HYDROCHLORIDE 10 MG/1
TABLET ORAL
Qty: 90 TABLET | Refills: 3 | Status: SHIPPED | OUTPATIENT
Start: 2022-10-18

## 2022-10-18 RX ORDER — OMEGA-3S/DHA/EPA/FISH OIL/D3 300MG-1000
400 CAPSULE ORAL DAILY
Qty: 30 TABLET | Refills: 5 | Status: SHIPPED | OUTPATIENT
Start: 2022-10-18

## 2022-10-18 RX ORDER — HYDROCODONE BITARTRATE AND ACETAMINOPHEN 7.5; 325 MG/1; MG/1
1 TABLET ORAL EVERY 6 HOURS PRN
Qty: 90 TABLET | Refills: 0 | Status: SHIPPED | OUTPATIENT
Start: 2022-10-18 | End: 2022-12-01 | Stop reason: SDUPTHER

## 2022-10-18 RX ORDER — CLONIDINE HYDROCHLORIDE 0.2 MG/1
TABLET ORAL
Qty: 60 TABLET | Refills: 5 | Status: SHIPPED | OUTPATIENT
Start: 2022-10-18

## 2022-10-18 RX ORDER — PANTOPRAZOLE SODIUM 20 MG/1
TABLET, DELAYED RELEASE ORAL
Qty: 30 TABLET | Refills: 5 | Status: SHIPPED | OUTPATIENT
Start: 2022-10-18

## 2022-10-18 RX ORDER — ALBUTEROL SULFATE 2.5 MG/3ML
2.5 SOLUTION RESPIRATORY (INHALATION) DAILY PRN
Qty: 120 EACH | Refills: 5 | Status: SHIPPED | OUTPATIENT
Start: 2022-10-18

## 2022-10-18 RX ORDER — GUAIFENESIN 600 MG/1
600 TABLET, EXTENDED RELEASE ORAL 2 TIMES DAILY
Qty: 60 TABLET | Refills: 5 | Status: SHIPPED | OUTPATIENT
Start: 2022-10-18

## 2022-10-18 RX ORDER — ESCITALOPRAM OXALATE 20 MG/1
20 TABLET ORAL DAILY
Qty: 30 TABLET | Refills: 5 | Status: SHIPPED | OUTPATIENT
Start: 2022-10-18

## 2022-10-18 RX ORDER — HYDROXYZINE PAMOATE 25 MG/1
CAPSULE ORAL
Qty: 60 CAPSULE | Refills: 5 | Status: SHIPPED | OUTPATIENT
Start: 2022-10-18

## 2022-10-18 RX ORDER — GABAPENTIN 100 MG/1
CAPSULE ORAL
Qty: 90 CAPSULE | Refills: 2 | Status: SHIPPED | OUTPATIENT
Start: 2022-10-18 | End: 2023-01-15

## 2022-10-18 RX ORDER — FLUTICASONE PROPIONATE 50 MCG
SPRAY, SUSPENSION (ML) NASAL
Qty: 64 G | Refills: 5 | Status: SHIPPED | OUTPATIENT
Start: 2022-10-18

## 2022-10-18 ASSESSMENT — ENCOUNTER SYMPTOMS
RESPIRATORY NEGATIVE: 1
GASTROINTESTINAL NEGATIVE: 1
EYES NEGATIVE: 1

## 2022-10-18 NOTE — PATIENT INSTRUCTIONS
The medication list included in this document is our record of what you are currently taking, including any changes that were made at today's visit. If you find any differences when compared to your medications at home, or have any questions that were not answered at your visit, please contact the office. We are committed to providing you with the best care possible. In order to help us achieve these goals please remember to bring all medications, herbal products, and over the counter supplements with you to each visit. If your provider has ordered testing for you, please be sure to follow up with our office if you have not received results within 7 days after the testing took place. *If you receive a survey after visiting one of our offices, please take time to share your experience concerning your physician office visit. These surveys are confidential and no health information about you is shared. We are eager to improve for you and we are counting on your feedback to help make that happen.

## 2022-10-18 NOTE — PROGRESS NOTES
Chief Complaint   Patient presents with    Lower Back Pain       Have you seen any other physician or provider since your last visit no    Have you had any other diagnostic tests since your last visit? no    Have you changed or stopped any medications since your last visit? no

## 2022-10-18 NOTE — PROGRESS NOTES
Appearance: Normal appearance. He is underweight. He is not ill-appearing, toxic-appearing or diaphoretic. HENT:      Head: Normocephalic and atraumatic. Right Ear: Hearing, ear canal and external ear normal. Tympanic membrane is erythematous. Left Ear: Hearing, tympanic membrane, ear canal and external ear normal.      Nose: Mucosal edema present. Mouth/Throat:      Mouth: Mucous membranes are moist.      Pharynx: Oropharynx is clear. Eyes:      General: Lids are normal.      Pupils: Pupils are equal, round, and reactive to light. Neck:      Thyroid: No thyroid mass, thyromegaly or thyroid tenderness. Vascular: No carotid bruit or JVD. Trachea: Trachea and phonation normal.   Cardiovascular:      Rate and Rhythm: Normal rate and regular rhythm. Pulses: Normal pulses. Heart sounds: S1 normal and S2 normal.   Pulmonary:      Effort: Pulmonary effort is normal.      Breath sounds: Normal breath sounds. Musculoskeletal:      Cervical back: Full passive range of motion without pain, normal range of motion and neck supple. Lumbar back: Spasms and tenderness present. Decreased range of motion. Right lower leg: No edema. Left lower leg: No edema. Right ankle: Deformity present. Tenderness present. Decreased range of motion. Left ankle: Deformity present. Tenderness present. Decreased range of motion. Right foot: Decreased range of motion. Normal capillary refill. Deformity present. No foot drop. Normal pulse. Left foot: Decreased range of motion. Normal capillary refill. Deformity present. No foot drop. Normal pulse. Lymphadenopathy:      Cervical: No cervical adenopathy. Skin:     General: Skin is warm and dry. Capillary Refill: Capillary refill takes less than 2 seconds. Neurological:      Mental Status: He is alert and oriented to person, place, and time.    Psychiatric:         Attention and Perception: Attention and perception normal.         Mood and Affect: Mood and affect normal.         Speech: Speech normal.         Behavior: Behavior normal. Behavior is cooperative. Thought Content: Thought content normal.         Cognition and Memory: Cognition and memory normal.         Judgment: Judgment normal.        No results found for requested labs within last 30 days. No results found for: Guadalupe Primcarlene, LDLCALC    Lab Results   Component Value Date/Time    WBC 6.2 11/28/2020 08:40 PM    NEUTROABS 2.3 11/28/2020 08:40 PM    HGB 17.5 11/28/2020 08:40 PM    HCT 49.9 11/28/2020 08:40 PM    MCV 89.1 11/28/2020 08:40 PM     11/28/2020 08:40 PM     No results found for: TSH    ASSESSMENT/PLAN    Diagnosis Orders   1. Chronic midline low back pain without sciatica  HYDROcodone-acetaminophen (NORCO) 7.5-325 MG per tablet    gabapentin (NEURONTIN) 100 MG capsule      2. Chronic midline thoracic back pain  HYDROcodone-acetaminophen (NORCO) 7.5-325 MG per tablet    gabapentin (NEURONTIN) 100 MG capsule      3. Chemotherapy-induced peripheral neuropathy (HCC)  HYDROcodone-acetaminophen (NORCO) 7.5-325 MG per tablet    gabapentin (NEURONTIN) 100 MG capsule      4. Insomnia, unspecified type  melatonin 5 MG TABS tablet      5. Vitamin D deficiency  vitamin D3 (CHOLECALCIFEROL) 10 MCG (400 UNIT) TABS tablet      6. Anxiety  hydrOXYzine pamoate (VISTARIL) 25 MG capsule    escitalopram (LEXAPRO) 20 MG tablet      7. Reactive depression  escitalopram (LEXAPRO) 20 MG tablet      8. Gastroesophageal reflux disease without esophagitis  pantoprazole (PROTONIX) 20 MG tablet      9. Environmental and seasonal allergies  cetirizine (ZYRTEC) 10 MG tablet    fluticasone (FLONASE) 50 MCG/ACT nasal spray      10. Sinus congestion  cetirizine (ZYRTEC) 10 MG tablet    fluticasone (FLONASE) 50 MCG/ACT nasal spray      11.  Cough  albuterol (PROVENTIL) (2.5 MG/3ML) 0.083% nebulizer solution    guaiFENesin (MUCINEX) 600 MG extended release tablet      12. Mild persistent reactive airway disease without complication  albuterol (PROVENTIL) (2.5 MG/3ML) 0.083% nebulizer solution      13. Nausea  promethazine (PHENERGAN) 25 MG tablet          Orders Placed This Encounter   Medications    HYDROcodone-acetaminophen (NORCO) 7.5-325 MG per tablet     Sig: Take 1 tablet by mouth every 6 hours as needed for Pain for up to 30 days.      Dispense:  90 tablet     Refill:  0     Reduce doses taken as pain becomes manageable    melatonin 5 MG TABS tablet     Sig: TAKE 1 TABLET BY MOUTH EVERY NIGHT     Dispense:  30 tablet     Refill:  5    vitamin D3 (CHOLECALCIFEROL) 10 MCG (400 UNIT) TABS tablet     Sig: Take 1 tablet by mouth daily     Dispense:  30 tablet     Refill:  5    hydrOXYzine pamoate (VISTARIL) 25 MG capsule     Sig: TAKE 1 CAPSULE BY MOUTH NIGHTLY OR AS NEEDED FOR ANXIETY     Dispense:  60 capsule     Refill:  5    gabapentin (NEURONTIN) 100 MG capsule     Sig: Take one tablet at bedtime     Dispense:  90 capsule     Refill:  2    cloNIDine (CATAPRES) 0.2 MG tablet     Sig: TAKE 1 TABLET BY MOUTH TWICE DAILY     Dispense:  60 tablet     Refill:  5    escitalopram (LEXAPRO) 20 MG tablet     Sig: Take 1 tablet by mouth daily     Dispense:  30 tablet     Refill:  5    pantoprazole (PROTONIX) 20 MG tablet     Sig: TAKE 1 TABLET BY MOUTH EVERY MORNING BEFORE BREAKFAST     Dispense:  30 tablet     Refill:  5    cetirizine (ZYRTEC) 10 MG tablet     Sig: TAKE 1 TABLET BY MOUTH EVERY DAY     Dispense:  90 tablet     Refill:  3    fluticasone (FLONASE) 50 MCG/ACT nasal spray     Sig: INSTILL 1 SPRAY INTO EACH NOSTRIL ONCE DAILY FOR 7 DAYS THEN IF NEEDED     Dispense:  64 g     Refill:  5    albuterol (PROVENTIL) (2.5 MG/3ML) 0.083% nebulizer solution     Sig: Take 3 mLs by nebulization daily as needed for Wheezing     Dispense:  120 each     Refill:  5    promethazine (PHENERGAN) 25 MG tablet     Sig: Take 1 tablet by mouth as needed for Nausea     Dispense:  30

## 2022-11-08 RX ORDER — CLONIDINE HYDROCHLORIDE 0.2 MG/1
TABLET ORAL
Qty: 60 TABLET | Refills: 5 | OUTPATIENT
Start: 2022-11-08

## 2022-11-30 DIAGNOSIS — M54.50 CHRONIC MIDLINE LOW BACK PAIN WITHOUT SCIATICA: ICD-10-CM

## 2022-11-30 DIAGNOSIS — T45.1X5A CHEMOTHERAPY-INDUCED PERIPHERAL NEUROPATHY (HCC): ICD-10-CM

## 2022-11-30 DIAGNOSIS — G89.29 CHRONIC MIDLINE THORACIC BACK PAIN: ICD-10-CM

## 2022-11-30 DIAGNOSIS — G89.29 CHRONIC MIDLINE LOW BACK PAIN WITHOUT SCIATICA: ICD-10-CM

## 2022-11-30 DIAGNOSIS — G62.0 CHEMOTHERAPY-INDUCED PERIPHERAL NEUROPATHY (HCC): ICD-10-CM

## 2022-11-30 DIAGNOSIS — M54.6 CHRONIC MIDLINE THORACIC BACK PAIN: ICD-10-CM

## 2022-11-30 NOTE — TELEPHONE ENCOUNTER
Patient called, requested refill.        Next Office Visit Date:  Future Appointments   Date Time Provider Janneth Murcia   1/18/2023  1:45 PM MD Dae Christy 82 please review via [address]

## 2022-11-30 NOTE — TELEPHONE ENCOUNTER
----- Message from Lore Palacios sent at 11/30/2022  2:11 PM EST -----  Regarding: refill pain meds  Requested refill on pain meds  HYDROcodone-acetaminophen (NORCO) 7.5-325 MG per tablet   gabapentin (NEURONTIN) 100 MG capsule

## 2022-12-01 RX ORDER — HYDROCODONE BITARTRATE AND ACETAMINOPHEN 7.5; 325 MG/1; MG/1
1 TABLET ORAL EVERY 6 HOURS PRN
Qty: 90 TABLET | Refills: 0 | Status: SHIPPED | OUTPATIENT
Start: 2022-12-01 | End: 2022-12-31

## 2023-01-10 DIAGNOSIS — G62.0 CHEMOTHERAPY-INDUCED PERIPHERAL NEUROPATHY (HCC): ICD-10-CM

## 2023-01-10 DIAGNOSIS — G89.29 CHRONIC MIDLINE LOW BACK PAIN WITHOUT SCIATICA: ICD-10-CM

## 2023-01-10 DIAGNOSIS — M54.50 CHRONIC MIDLINE LOW BACK PAIN WITHOUT SCIATICA: ICD-10-CM

## 2023-01-10 DIAGNOSIS — G89.29 CHRONIC MIDLINE THORACIC BACK PAIN: ICD-10-CM

## 2023-01-10 DIAGNOSIS — M54.6 CHRONIC MIDLINE THORACIC BACK PAIN: ICD-10-CM

## 2023-01-10 DIAGNOSIS — T45.1X5A CHEMOTHERAPY-INDUCED PERIPHERAL NEUROPATHY (HCC): ICD-10-CM

## 2023-01-10 NOTE — TELEPHONE ENCOUNTER
Patient called, requested refill.        Next Office Visit Date:  Future Appointments   Date Time Provider Janneth Murcia   1/18/2023  1:45 PM Tisha Akers MD [address] please review via [address]

## 2023-01-12 RX ORDER — HYDROCODONE BITARTRATE AND ACETAMINOPHEN 7.5; 325 MG/1; MG/1
1 TABLET ORAL EVERY 6 HOURS PRN
Qty: 90 TABLET | Refills: 0 | Status: SHIPPED | OUTPATIENT
Start: 2023-01-12 | End: 2023-02-11

## 2023-01-18 ENCOUNTER — HOSPITAL ENCOUNTER (OUTPATIENT)
Facility: HOSPITAL | Age: 23
Discharge: HOME | End: 2023-01-18
Payer: MEDICAID

## 2023-01-18 ENCOUNTER — OFFICE VISIT (OUTPATIENT)
Dept: FAMILY MEDICINE CLINIC | Age: 23
End: 2023-01-18
Payer: MEDICAID

## 2023-01-18 VITALS
RESPIRATION RATE: 12 BRPM | HEIGHT: 70 IN | WEIGHT: 145.8 LBS | HEART RATE: 73 BPM | OXYGEN SATURATION: 98 % | BODY MASS INDEX: 20.87 KG/M2 | DIASTOLIC BLOOD PRESSURE: 72 MMHG | SYSTOLIC BLOOD PRESSURE: 118 MMHG | TEMPERATURE: 98.2 F

## 2023-01-18 DIAGNOSIS — G89.29 CHRONIC MIDLINE LOW BACK PAIN WITHOUT SCIATICA: ICD-10-CM

## 2023-01-18 DIAGNOSIS — E55.9 VITAMIN D DEFICIENCY: ICD-10-CM

## 2023-01-18 DIAGNOSIS — M54.6 CHRONIC MIDLINE THORACIC BACK PAIN: ICD-10-CM

## 2023-01-18 DIAGNOSIS — R53.83 OTHER FATIGUE: ICD-10-CM

## 2023-01-18 DIAGNOSIS — M54.50 CHRONIC MIDLINE LOW BACK PAIN WITHOUT SCIATICA: ICD-10-CM

## 2023-01-18 DIAGNOSIS — G62.0 CHEMOTHERAPY-INDUCED PERIPHERAL NEUROPATHY (HCC): ICD-10-CM

## 2023-01-18 DIAGNOSIS — G89.29 CHRONIC MIDLINE THORACIC BACK PAIN: ICD-10-CM

## 2023-01-18 DIAGNOSIS — T45.1X5A CHEMOTHERAPY-INDUCED PERIPHERAL NEUROPATHY (HCC): ICD-10-CM

## 2023-01-18 DIAGNOSIS — E55.9 VITAMIN D DEFICIENCY: Primary | ICD-10-CM

## 2023-01-18 PROCEDURE — 85027 COMPLETE CBC AUTOMATED: CPT

## 2023-01-18 PROCEDURE — 80053 COMPREHEN METABOLIC PANEL: CPT

## 2023-01-18 PROCEDURE — 84443 ASSAY THYROID STIM HORMONE: CPT

## 2023-01-18 PROCEDURE — 36415 COLL VENOUS BLD VENIPUNCTURE: CPT

## 2023-01-18 PROCEDURE — G8484 FLU IMMUNIZE NO ADMIN: HCPCS | Performed by: FAMILY MEDICINE

## 2023-01-18 PROCEDURE — 82746 ASSAY OF FOLIC ACID SERUM: CPT

## 2023-01-18 PROCEDURE — G8420 CALC BMI NORM PARAMETERS: HCPCS | Performed by: FAMILY MEDICINE

## 2023-01-18 PROCEDURE — 1036F TOBACCO NON-USER: CPT | Performed by: FAMILY MEDICINE

## 2023-01-18 PROCEDURE — 82607 VITAMIN B-12: CPT

## 2023-01-18 PROCEDURE — 99214 OFFICE O/P EST MOD 30 MIN: CPT | Performed by: FAMILY MEDICINE

## 2023-01-18 PROCEDURE — 82306 VITAMIN D 25 HYDROXY: CPT

## 2023-01-18 PROCEDURE — G8427 DOCREV CUR MEDS BY ELIG CLIN: HCPCS | Performed by: FAMILY MEDICINE

## 2023-01-18 RX ORDER — HYDROCODONE BITARTRATE AND ACETAMINOPHEN 7.5; 325 MG/1; MG/1
1 TABLET ORAL EVERY 6 HOURS PRN
Qty: 90 TABLET | Refills: 0 | Status: SHIPPED | OUTPATIENT
Start: 2023-01-18 | End: 2023-02-17

## 2023-01-18 RX ORDER — GABAPENTIN 100 MG/1
CAPSULE ORAL
Qty: 90 CAPSULE | Refills: 2 | Status: SHIPPED | OUTPATIENT
Start: 2023-01-18 | End: 2023-04-20

## 2023-01-18 ASSESSMENT — ENCOUNTER SYMPTOMS
GASTROINTESTINAL NEGATIVE: 1
RESPIRATORY NEGATIVE: 1
EYES NEGATIVE: 1

## 2023-01-18 ASSESSMENT — PATIENT HEALTH QUESTIONNAIRE - PHQ9
9. THOUGHTS THAT YOU WOULD BE BETTER OFF DEAD, OR OF HURTING YOURSELF: 0
2. FEELING DOWN, DEPRESSED OR HOPELESS: 0
3. TROUBLE FALLING OR STAYING ASLEEP: 0
4. FEELING TIRED OR HAVING LITTLE ENERGY: 1
5. POOR APPETITE OR OVEREATING: 0
6. FEELING BAD ABOUT YOURSELF - OR THAT YOU ARE A FAILURE OR HAVE LET YOURSELF OR YOUR FAMILY DOWN: 0
SUM OF ALL RESPONSES TO PHQ QUESTIONS 1-9: 2
SUM OF ALL RESPONSES TO PHQ QUESTIONS 1-9: 2
7. TROUBLE CONCENTRATING ON THINGS, SUCH AS READING THE NEWSPAPER OR WATCHING TELEVISION: 1
SUM OF ALL RESPONSES TO PHQ QUESTIONS 1-9: 2
8. MOVING OR SPEAKING SO SLOWLY THAT OTHER PEOPLE COULD HAVE NOTICED. OR THE OPPOSITE, BEING SO FIGETY OR RESTLESS THAT YOU HAVE BEEN MOVING AROUND A LOT MORE THAN USUAL: 0
SUM OF ALL RESPONSES TO PHQ QUESTIONS 1-9: 2

## 2023-01-18 NOTE — PROGRESS NOTES
SUBJECTIVE:    Patient ID: Claudine Landaverde is a 25 y.o. male. Chief Complaint   Patient presents with    Back Pain       HPI: office visit  Is in the office in follow-up of his back and leg pain. He is getting some pretty good relief with his medicine. He feels like he is slowly getting somewhat better. He has not had any recent significant falls. He is doing more normal activities. He is still doing some therapy. He still seeing the specialist at [facility]. He has not had any recent new issues. Review of Systems   Constitutional:  Positive for fatigue. Eyes: Negative. Respiratory: Negative. Cardiovascular: Negative. Gastrointestinal: Negative. Endocrine: Negative. Genitourinary: Negative. Musculoskeletal: Negative. Neurological: Negative. Psychiatric/Behavioral: Negative. All other systems reviewed and are negative. OBJECTIVE:  /72 (Site: Left Upper Arm, Position: Sitting, Cuff Size: Medium Adult)   Pulse 73   Temp 98.2 °F (36.8 °C) (Temporal)   Resp 12   Ht 5' 10\" (1.778 m)   Wt 145 lb 12.8 oz (66.1 kg)   SpO2 98%   BMI 20.92 kg/m²    Wt Readings from Last 3 Encounters:   01/18/23 145 lb 12.8 oz (66.1 kg)   10/18/22 141 lb (64 kg)   07/21/22 142 lb 6.4 oz (64.6 kg)     BP Readings from Last 3 Encounters:   01/18/23 118/72   10/18/22 122/68   07/21/22 100/68      Pulse Readings from Last 3 Encounters:   01/18/23 73   10/18/22 59   07/21/22 65     Body mass index is 20.92 kg/m². Resp Readings from Last 3 Encounters:   01/18/23 12   07/21/22 14   01/05/22 16     Past medical, surgical, family and social history were reviewed and updated with the patient. Physical Exam  Vitals and nursing note reviewed. Constitutional:       Appearance: Normal appearance. He is underweight. He is not ill-appearing, toxic-appearing or diaphoretic. HENT:      Head: Normocephalic and atraumatic.       Right Ear: Hearing, ear canal and external ear normal. Tympanic membrane is erythematous.      Left Ear: Hearing, tympanic membrane, ear canal and external ear normal.      Nose: Mucosal edema present.      Mouth/Throat:      Mouth: Mucous membranes are moist.      Pharynx: Oropharynx is clear.   Eyes:      General: Lids are normal.      Pupils: Pupils are equal, round, and reactive to light.   Neck:      Thyroid: No thyroid mass, thyromegaly or thyroid tenderness.      Vascular: No carotid bruit or JVD.      Trachea: Trachea and phonation normal.   Cardiovascular:      Rate and Rhythm: Normal rate and regular rhythm.      Pulses: Normal pulses.      Heart sounds: S1 normal and S2 normal.   Pulmonary:      Effort: Pulmonary effort is normal.      Breath sounds: Normal breath sounds.   Musculoskeletal:      Cervical back: Full passive range of motion without pain, normal range of motion and neck supple.      Lumbar back: Spasms and tenderness present. Decreased range of motion.      Right lower leg: No edema.      Left lower leg: No edema.      Right ankle: Deformity present. Tenderness present. Decreased range of motion.      Left ankle: Deformity present. Tenderness present. Decreased range of motion.      Right foot: Decreased range of motion. Normal capillary refill. Deformity present. No foot drop. Normal pulse.      Left foot: Decreased range of motion. Normal capillary refill. Deformity present. No foot drop. Normal pulse.   Lymphadenopathy:      Cervical: No cervical adenopathy.   Skin:     General: Skin is warm and dry.      Capillary Refill: Capillary refill takes less than 2 seconds.   Neurological:      Mental Status: He is alert and oriented to person, place, and time.   Psychiatric:         Attention and Perception: Attention and perception normal.         Mood and Affect: Mood and affect normal.         Speech: Speech normal.         Behavior: Behavior normal. Behavior is cooperative.         Thought Content: Thought content normal.         Cognition and Memory: Cognition and  memory normal.         Judgment: Judgment normal.        Results in Past 30 Days  Result Component Current Result Ref Range Previous Result Ref Range   Albumin/Globulin Ratio 2.1 (H) (1/18/2023) 0.8 - 2.0 Not in Time Range    Albumin 4.7 (1/18/2023) 3.4 - 4.8 g/dL Not in Time Range    Alkaline Phosphatase 160 (H) (1/18/2023) 25 - 100 U/L Not in Time Range    ALT 34 (1/18/2023) 4 - 36 U/L Not in Time Range    AST 21 (1/18/2023) 8 - 33 U/L Not in Time Range    BUN 16 (1/18/2023) 6 - 20 mg/dL Not in Time Range    Calcium 9.8 (1/18/2023) 8.5 - 10.5 mg/dL Not in Time Range    Chloride 104 (1/18/2023) 98 - 107 mmol/L Not in Time Range    CO2 29 (1/18/2023) 20 - 30 mmol/L Not in Time Range    Creatinine 0.7 (1/18/2023) 0.4 - 1.2 mg/dL Not in Time Range    Est, Glom Filt Rate >60 (1/18/2023) >59 Not in Time Range    Globulin 2.2 (1/18/2023) Not Established g/dL Not in Time Range    Glucose 92 (1/18/2023) 74 - 106 mg/dL Not in Time Range    Potassium 4.4 (1/18/2023) 3.4 - 5.1 mmol/L Not in Time Range    Sodium 143 (1/18/2023) 136 - 145 mmol/L Not in Time Range    Total Bilirubin 0.4 (1/18/2023) 0.3 - 1.2 mg/dL Not in Time Range    Total Protein 6.9 (1/18/2023) 6.4 - 8.3 g/dL Not in Time Range      No results found for: LABA1C, LABMICR, LDLCALC    Lab Results   Component Value Date/Time    WBC 8.3 01/18/2023 02:41 PM    NEUTROABS 2.3 11/28/2020 08:40 PM    HGB 16.2 01/18/2023 02:41 PM    HCT 48.7 01/18/2023 02:41 PM    MCV 93.8 01/18/2023 02:41 PM     01/18/2023 02:41 PM     Lab Results   Component Value Date    TSH 1.37 01/18/2023       ASSESSMENT/PLAN    Diagnosis Orders   1. Vitamin D deficiency  Vitamin D 25 Hydroxy      2. Chronic midline low back pain without sciatica  gabapentin (NEURONTIN) 100 MG capsule    HYDROcodone-acetaminophen (NORCO) 7.5-325 MG per tablet      3. Chronic midline thoracic back pain  gabapentin (NEURONTIN) 100 MG capsule    HYDROcodone-acetaminophen (NORCO) 7.5-325 MG per tablet      4. Chemotherapy-induced peripheral neuropathy (HCC)  gabapentin (NEURONTIN) 100 MG capsule    HYDROcodone-acetaminophen (NORCO) 7.5-325 MG per tablet      5. Other fatigue  Comprehensive Metabolic Panel    CBC    TSH    Vitamin B12 & Folate          Orders Placed This Encounter   Medications    gabapentin (NEURONTIN) 100 MG capsule     Sig: Take one tablet at bedtime     Dispense:  90 capsule     Refill:  2    HYDROcodone-acetaminophen (NORCO) 7.5-325 MG per tablet     Sig: Take 1 tablet by mouth every 6 hours as needed for Pain for up to 30 days. Dispense:  90 tablet     Refill:  0     Reduce doses taken as pain becomes manageable        Medications Discontinued During This Encounter   Medication Reason    gabapentin (NEURONTIN) 100 MG capsule REORDER    HYDROcodone-acetaminophen (NORCO) 7.5-325 MG per tablet REORDER       Controlled Substances Monitoring:      Please note: This chart was generated using Dragon dictation software. Although every effort was made to ensure the accuracy of this automated transcription, some errors in transcription may have occurred.

## 2023-01-18 NOTE — PROGRESS NOTES
Chief Complaint   Patient presents with    Back Pain       Have you seen any other physician or provider since your last visit no    Have you had any other diagnostic tests since your last visit? no    Have you changed or stopped any medications since your last visit? no         Diabetic retinal exam completed this year?  No                       * If yes please have patient sign a records release to obtain record to update Health Maintenance                       * If no, please order referral for patient to be scheduled  25year old here for follow up back pain

## 2023-01-19 LAB
A/G RATIO: 2.1 (ref 0.8–2)
ALBUMIN SERPL-MCNC: 4.7 G/DL (ref 3.4–4.8)
ALP BLD-CCNC: 160 U/L (ref 25–100)
ALT SERPL-CCNC: 34 U/L (ref 4–36)
ANION GAP SERPL CALCULATED.3IONS-SCNC: 10 MMOL/L (ref 3–16)
AST SERPL-CCNC: 21 U/L (ref 8–33)
BILIRUB SERPL-MCNC: 0.4 MG/DL (ref 0.3–1.2)
BUN BLDV-MCNC: 16 MG/DL (ref 6–20)
CALCIUM SERPL-MCNC: 9.8 MG/DL (ref 8.5–10.5)
CHLORIDE BLD-SCNC: 104 MMOL/L (ref 98–107)
CO2: 29 MMOL/L (ref 20–30)
CREAT SERPL-MCNC: 0.7 MG/DL (ref 0.4–1.2)
FOLATE: 9.63 NG/ML
GFR SERPL CREATININE-BSD FRML MDRD: >60 ML/MIN/{1.73_M2}
GLOBULIN: 2.2 G/DL
GLUCOSE BLD-MCNC: 92 MG/DL (ref 74–106)
HCT VFR BLD CALC: 48.7 % (ref 40–54)
HEMOGLOBIN: 16.2 G/DL (ref 13–18)
MCH RBC QN AUTO: 31.2 PG (ref 27–32)
MCHC RBC AUTO-ENTMCNC: 33.3 G/DL (ref 31–35)
MCV RBC AUTO: 93.8 FL (ref 80–100)
PDW BLD-RTO: 11.9 % (ref 11–16)
PLATELET # BLD: 176 K/UL (ref 150–400)
PMV BLD AUTO: 9.7 FL (ref 6–10)
POTASSIUM SERPL-SCNC: 4.4 MMOL/L (ref 3.4–5.1)
RBC # BLD: 5.19 M/UL (ref 4.5–6)
SODIUM BLD-SCNC: 143 MMOL/L (ref 136–145)
TOTAL PROTEIN: 6.9 G/DL (ref 6.4–8.3)
TSH SERPL DL<=0.05 MIU/L-ACNC: 1.37 UIU/ML (ref 0.27–4.2)
VITAMIN B-12: 527 PG/ML (ref 211–911)
VITAMIN D 25-HYDROXY: 34 (ref 32–100)
WBC # BLD: 8.3 K/UL (ref 4–11)

## 2023-03-20 DIAGNOSIS — G89.29 CHRONIC MIDLINE THORACIC BACK PAIN: ICD-10-CM

## 2023-03-20 DIAGNOSIS — T45.1X5A CHEMOTHERAPY-INDUCED PERIPHERAL NEUROPATHY (HCC): ICD-10-CM

## 2023-03-20 DIAGNOSIS — M54.50 CHRONIC MIDLINE LOW BACK PAIN WITHOUT SCIATICA: ICD-10-CM

## 2023-03-20 DIAGNOSIS — M54.6 CHRONIC MIDLINE THORACIC BACK PAIN: ICD-10-CM

## 2023-03-20 DIAGNOSIS — G62.0 CHEMOTHERAPY-INDUCED PERIPHERAL NEUROPATHY (HCC): ICD-10-CM

## 2023-03-20 DIAGNOSIS — G89.29 CHRONIC MIDLINE LOW BACK PAIN WITHOUT SCIATICA: ICD-10-CM

## 2023-03-20 RX ORDER — HYDROCODONE BITARTRATE AND ACETAMINOPHEN 7.5; 325 MG/1; MG/1
1 TABLET ORAL EVERY 6 HOURS PRN
Qty: 90 TABLET | Refills: 0 | Status: SHIPPED | OUTPATIENT
Start: 2023-03-20 | End: 2023-04-19

## 2023-03-20 NOTE — TELEPHONE ENCOUNTER
Patient called, requested refill.        Next Office Visit Date:  Future Appointments   Date Time Provider Janneth Divina   4/18/2023  1:30 PM Javier Means MD [address] please review via [id number] to [facility] for approval. Dr. Anaid Leyva currently out of office

## 2023-04-11 ENCOUNTER — HOSPITAL ENCOUNTER (EMERGENCY)
Facility: HOSPITAL | Age: 23
Discharge: HOME | End: 2023-04-11
Attending: EMERGENCY MEDICINE
Payer: MEDICAID

## 2023-04-11 VITALS
DIASTOLIC BLOOD PRESSURE: 79 MMHG | TEMPERATURE: 98.4 F | HEIGHT: 70 IN | BODY MASS INDEX: 20.76 KG/M2 | HEART RATE: 85 BPM | OXYGEN SATURATION: 100 % | RESPIRATION RATE: 16 BRPM | WEIGHT: 145 LBS | SYSTOLIC BLOOD PRESSURE: 126 MMHG

## 2023-04-11 DIAGNOSIS — S51.812A LACERATION OF LEFT FOREARM, INITIAL ENCOUNTER: Primary | ICD-10-CM

## 2023-04-11 PROCEDURE — 90715 TDAP VACCINE 7 YRS/> IM: CPT | Performed by: EMERGENCY MEDICINE

## 2023-04-11 PROCEDURE — 90471 IMMUNIZATION ADMIN: CPT | Performed by: EMERGENCY MEDICINE

## 2023-04-11 PROCEDURE — 2500000003 HC RX 250 WO HCPCS: Performed by: EMERGENCY MEDICINE

## 2023-04-11 PROCEDURE — 6360000002 HC RX W HCPCS: Performed by: EMERGENCY MEDICINE

## 2023-04-11 PROCEDURE — 12002 RPR S/N/AX/GEN/TRNK2.6-7.5CM: CPT

## 2023-04-11 PROCEDURE — 99284 EMERGENCY DEPT VISIT MOD MDM: CPT

## 2023-04-11 RX ORDER — LIDOCAINE HYDROCHLORIDE AND EPINEPHRINE 10; 10 MG/ML; UG/ML
20 INJECTION, SOLUTION INFILTRATION; PERINEURAL ONCE
Status: COMPLETED | OUTPATIENT
Start: 2023-04-11 | End: 2023-04-11

## 2023-04-11 RX ADMIN — LIDOCAINE HYDROCHLORIDE,EPINEPHRINE BITARTRATE 20 ML: 10; .01 INJECTION, SOLUTION INFILTRATION; PERINEURAL at 11:10

## 2023-04-11 RX ADMIN — TETANUS TOXOID, REDUCED DIPHTHERIA TOXOID AND ACELLULAR PERTUSSIS VACCINE, ADSORBED 0.5 ML: 5; 2.5; 8; 8; 2.5 SUSPENSION INTRAMUSCULAR at 11:09

## 2023-04-11 ASSESSMENT — PAIN - FUNCTIONAL ASSESSMENT: PAIN_FUNCTIONAL_ASSESSMENT: 0-10

## 2023-04-11 ASSESSMENT — PAIN DESCRIPTION - DESCRIPTORS: DESCRIPTORS: BURNING

## 2023-04-11 ASSESSMENT — PAIN DESCRIPTION - PAIN TYPE: TYPE: ACUTE PAIN

## 2023-04-11 ASSESSMENT — PAIN DESCRIPTION - ORIENTATION: ORIENTATION: LEFT

## 2023-04-11 ASSESSMENT — PAIN DESCRIPTION - LOCATION: LOCATION: ARM

## 2023-04-11 NOTE — ED PROVIDER NOTES
Height: 5' 10\" (1.778 m)       MEDICATIONS ADMINISTERED IN ED:  Medications   tetanus-diphth-acell pertussis (BOOSTRIX) injection 0.5 mL (0.5 mLs IntraMUSCular Given 4/11/23 1109)   lidocaine-EPINEPHrine 1 %-1:549877 injection 20 mL (20 mLs IntraDERmal Given 4/11/23 1110)       Laceration repaired as per description below patient tolerated procedure well will discharge to home to follow-up with his primary care in a week for suture removal.      The patient will follow-up with their PCP in 1-2 days for reevaluation. If the patient or family members have anyfurther concerns or any worsening symptoms they will return to the ED for reevaluation. Is this patient to be included in the SEP-1 Core Measure due to severe sepsis or septic shock? No   Exclusion criteria - the patient is NOT to be included for SEP-1 Core Measure due to: Infection is not suspected          CONSULTS:  None    PROCEDURES:  Procedures patient placed with his left arm palm down on the bed the area of the laceration was prepped and draped in usual sterile fashion anesthesia was obtained using 1% lidocaine with epinephrine once anesthesia was obtained the wound was repaired using 3-0 nylon interrupted sutures for a total of 7. CRITICAL CARE TIME    Total Critical Care time was 0 minutes, excluding separately reportable procedures. There was a high probability of clinically significant/life threatening deterioration in the patient's condition which required my urgent intervention. FINAL IMPRESSION      1.  Laceration of left forearm, initial encounter New Problem         DISPOSITION/PLAN   DISPOSITION Decision To Discharge 04/11/2023 11:36:26 AM   discharge to home    PATIENT REFERRED TO:  Sosa Porras MD  [address] [place] [address]  [phone]    Schedule an appointment as soon as possible for a visit in 1 week        DISCHARGE MEDICATIONS:  New Prescriptions    No medications on file       Comment: Please note this report

## 2023-04-17 ENCOUNTER — OFFICE VISIT (OUTPATIENT)
Dept: FAMILY MEDICINE CLINIC | Age: 23
End: 2023-04-17
Payer: MEDICAID

## 2023-04-17 VITALS
RESPIRATION RATE: 18 BRPM | OXYGEN SATURATION: 98 % | BODY MASS INDEX: 20.59 KG/M2 | DIASTOLIC BLOOD PRESSURE: 70 MMHG | TEMPERATURE: 97.1 F | WEIGHT: 143.8 LBS | SYSTOLIC BLOOD PRESSURE: 116 MMHG | HEIGHT: 70 IN | HEART RATE: 72 BPM

## 2023-04-17 DIAGNOSIS — Z48.02 VISIT FOR SUTURE REMOVAL: Primary | ICD-10-CM

## 2023-04-17 PROCEDURE — G8420 CALC BMI NORM PARAMETERS: HCPCS | Performed by: NURSE PRACTITIONER

## 2023-04-17 PROCEDURE — G8427 DOCREV CUR MEDS BY ELIG CLIN: HCPCS | Performed by: NURSE PRACTITIONER

## 2023-04-17 PROCEDURE — 1036F TOBACCO NON-USER: CPT | Performed by: NURSE PRACTITIONER

## 2023-04-17 PROCEDURE — 99212 OFFICE O/P EST SF 10 MIN: CPT | Performed by: NURSE PRACTITIONER

## 2023-04-17 NOTE — PROGRESS NOTES
Chief Complaint   Patient presents with    Suture / Staple Removal     Left forearm        Have you seen any other physician or provider since your last visit yes - [facility]     Have you had any other diagnostic tests since your last visit?  yes - Stitches     Have you changed or stopped any medications since your last visit? no
Mouth/Throat:      Mouth: Mucous membranes are moist.      Pharynx: Oropharynx is clear. Eyes:      Extraocular Movements: Extraocular movements intact. Conjunctiva/sclera: Conjunctivae normal.      Pupils: Pupils are equal, round, and reactive to light. Cardiovascular:      Rate and Rhythm: Normal rate and regular rhythm. Pulses: Normal pulses. Heart sounds: Normal heart sounds. Pulmonary:      Effort: Pulmonary effort is normal.      Breath sounds: Normal breath sounds. Abdominal:      General: Bowel sounds are normal.      Palpations: Abdomen is soft. Musculoskeletal:         General: Normal range of motion. Cervical back: Normal range of motion and neck supple. Skin:     General: Skin is warm and dry. Capillary Refill: Capillary refill takes less than 2 seconds. Neurological:      General: No focal deficit present. Mental Status: He is alert and oriented to person, place, and time. Psychiatric:         Mood and Affect: Mood normal.         Behavior: Behavior normal.        ASSESSMENT/PLAN    1. Visit for suture removal  Removed sutures, pt tolerated well. Applied steri strips because center laceration not completely approximated. Advised pt to keep skin clean and dry and allow steri strips to fall off. He is agreeable to poc.    - Apply steri strips             Hermelindo White, YOUNG - CNP

## 2023-04-20 ENCOUNTER — OFFICE VISIT (OUTPATIENT)
Dept: FAMILY MEDICINE CLINIC | Age: 23
End: 2023-04-20

## 2023-04-20 VITALS
HEIGHT: 71 IN | OXYGEN SATURATION: 97 % | DIASTOLIC BLOOD PRESSURE: 74 MMHG | SYSTOLIC BLOOD PRESSURE: 122 MMHG | RESPIRATION RATE: 18 BRPM | HEART RATE: 65 BPM | WEIGHT: 142.5 LBS | BODY MASS INDEX: 19.95 KG/M2 | TEMPERATURE: 98.4 F

## 2023-04-20 DIAGNOSIS — G89.29 CHRONIC MIDLINE THORACIC BACK PAIN: ICD-10-CM

## 2023-04-20 DIAGNOSIS — F32.9 REACTIVE DEPRESSION: ICD-10-CM

## 2023-04-20 DIAGNOSIS — G89.29 CHRONIC MIDLINE LOW BACK PAIN WITHOUT SCIATICA: ICD-10-CM

## 2023-04-20 DIAGNOSIS — T45.1X5A CHEMOTHERAPY-INDUCED PERIPHERAL NEUROPATHY (HCC): ICD-10-CM

## 2023-04-20 DIAGNOSIS — G62.0 CHEMOTHERAPY-INDUCED PERIPHERAL NEUROPATHY (HCC): ICD-10-CM

## 2023-04-20 DIAGNOSIS — M54.50 CHRONIC MIDLINE LOW BACK PAIN WITHOUT SCIATICA: ICD-10-CM

## 2023-04-20 DIAGNOSIS — M54.6 CHRONIC MIDLINE THORACIC BACK PAIN: ICD-10-CM

## 2023-04-20 DIAGNOSIS — G47.00 INSOMNIA, UNSPECIFIED TYPE: ICD-10-CM

## 2023-04-20 DIAGNOSIS — F41.9 ANXIETY: ICD-10-CM

## 2023-04-20 DIAGNOSIS — J30.89 ENVIRONMENTAL AND SEASONAL ALLERGIES: ICD-10-CM

## 2023-04-20 DIAGNOSIS — K21.9 GASTROESOPHAGEAL REFLUX DISEASE WITHOUT ESOPHAGITIS: ICD-10-CM

## 2023-04-20 RX ORDER — GABAPENTIN 100 MG/1
CAPSULE ORAL
Qty: 90 CAPSULE | Refills: 2 | Status: SHIPPED | OUTPATIENT
Start: 2023-04-20 | End: 2023-07-21

## 2023-04-20 RX ORDER — HYDROXYZINE PAMOATE 25 MG/1
CAPSULE ORAL
Qty: 180 CAPSULE | Refills: 3 | Status: SHIPPED | OUTPATIENT
Start: 2023-04-20

## 2023-04-20 RX ORDER — GUAIFENESIN 600 MG/1
600 TABLET, EXTENDED RELEASE ORAL 2 TIMES DAILY
Qty: 60 TABLET | Refills: 5 | Status: SHIPPED | OUTPATIENT
Start: 2023-04-20

## 2023-04-20 RX ORDER — MELATONIN 10 MG
10 CAPSULE ORAL NIGHTLY
Qty: 90 CAPSULE | Refills: 3 | Status: SHIPPED | OUTPATIENT
Start: 2023-04-20

## 2023-04-20 RX ORDER — FLUTICASONE PROPIONATE 50 MCG
SPRAY, SUSPENSION (ML) NASAL
Qty: 64 G | Refills: 5 | Status: SHIPPED | OUTPATIENT
Start: 2023-04-20

## 2023-04-20 RX ORDER — HYDROCODONE BITARTRATE AND ACETAMINOPHEN 7.5; 325 MG/1; MG/1
1 TABLET ORAL EVERY 6 HOURS PRN
Qty: 90 TABLET | Refills: 0 | Status: SHIPPED | OUTPATIENT
Start: 2023-04-20 | End: 2023-05-20

## 2023-04-20 RX ORDER — CHOLECALCIFEROL (VITAMIN D3) 125 MCG
CAPSULE ORAL
Qty: 30 TABLET | Refills: 5 | Status: CANCELLED | OUTPATIENT
Start: 2023-04-20

## 2023-04-20 RX ORDER — ESCITALOPRAM OXALATE 20 MG/1
20 TABLET ORAL DAILY
Qty: 90 TABLET | Refills: 3 | Status: SHIPPED | OUTPATIENT
Start: 2023-04-20

## 2023-04-20 RX ORDER — CLONIDINE HYDROCHLORIDE 0.2 MG/1
TABLET ORAL
Qty: 90 TABLET | Refills: 3 | Status: SHIPPED | OUTPATIENT
Start: 2023-04-20

## 2023-04-20 RX ORDER — PANTOPRAZOLE SODIUM 20 MG/1
TABLET, DELAYED RELEASE ORAL
Qty: 30 TABLET | Refills: 5 | Status: SHIPPED | OUTPATIENT
Start: 2023-04-20

## 2023-04-20 RX ORDER — CETIRIZINE HYDROCHLORIDE 10 MG/1
TABLET ORAL
Qty: 90 TABLET | Refills: 3 | Status: SHIPPED | OUTPATIENT
Start: 2023-04-20

## 2023-04-20 SDOH — ECONOMIC STABILITY: FOOD INSECURITY: WITHIN THE PAST 12 MONTHS, YOU WORRIED THAT YOUR FOOD WOULD RUN OUT BEFORE YOU GOT MONEY TO BUY MORE.: NEVER TRUE

## 2023-04-20 SDOH — ECONOMIC STABILITY: FOOD INSECURITY: WITHIN THE PAST 12 MONTHS, THE FOOD YOU BOUGHT JUST DIDN'T LAST AND YOU DIDN'T HAVE MONEY TO GET MORE.: NEVER TRUE

## 2023-04-20 SDOH — ECONOMIC STABILITY: INCOME INSECURITY: HOW HARD IS IT FOR YOU TO PAY FOR THE VERY BASICS LIKE FOOD, HOUSING, MEDICAL CARE, AND HEATING?: NOT VERY HARD

## 2023-04-20 SDOH — ECONOMIC STABILITY: HOUSING INSECURITY
IN THE LAST 12 MONTHS, WAS THERE A TIME WHEN YOU DID NOT HAVE A STEADY PLACE TO SLEEP OR SLEPT IN A SHELTER (INCLUDING NOW)?: NO

## 2023-04-20 ASSESSMENT — ENCOUNTER SYMPTOMS
EYES NEGATIVE: 1
RESPIRATORY NEGATIVE: 1
GASTROINTESTINAL NEGATIVE: 1

## 2023-05-22 DIAGNOSIS — M54.6 CHRONIC MIDLINE THORACIC BACK PAIN: ICD-10-CM

## 2023-05-22 DIAGNOSIS — M54.50 CHRONIC MIDLINE LOW BACK PAIN WITHOUT SCIATICA: ICD-10-CM

## 2023-05-22 DIAGNOSIS — G89.29 CHRONIC MIDLINE LOW BACK PAIN WITHOUT SCIATICA: ICD-10-CM

## 2023-05-22 DIAGNOSIS — G89.29 CHRONIC MIDLINE THORACIC BACK PAIN: ICD-10-CM

## 2023-05-22 DIAGNOSIS — G62.0 CHEMOTHERAPY-INDUCED PERIPHERAL NEUROPATHY (HCC): ICD-10-CM

## 2023-05-22 DIAGNOSIS — T45.1X5A CHEMOTHERAPY-INDUCED PERIPHERAL NEUROPATHY (HCC): ICD-10-CM

## 2023-05-22 RX ORDER — HYDROCODONE BITARTRATE AND ACETAMINOPHEN 7.5; 325 MG/1; MG/1
1 TABLET ORAL EVERY 6 HOURS PRN
Qty: 90 TABLET | Refills: 0 | Status: SHIPPED | OUTPATIENT
Start: 2023-05-22 | End: 2023-06-21

## 2023-05-22 NOTE — TELEPHONE ENCOUNTER
Patient called, requested refill.        Next Office Visit Date:  Future Appointments   Date Time Provider Janneth Murcia   7/25/2023  2:30 PM Benito Stoddard MD [address] please review via [address]

## 2023-07-11 DIAGNOSIS — G89.29 CHRONIC MIDLINE LOW BACK PAIN WITHOUT SCIATICA: ICD-10-CM

## 2023-07-11 DIAGNOSIS — M54.6 CHRONIC MIDLINE THORACIC BACK PAIN: ICD-10-CM

## 2023-07-11 DIAGNOSIS — M54.50 CHRONIC MIDLINE LOW BACK PAIN WITHOUT SCIATICA: ICD-10-CM

## 2023-07-11 DIAGNOSIS — T45.1X5A CHEMOTHERAPY-INDUCED PERIPHERAL NEUROPATHY (HCC): ICD-10-CM

## 2023-07-11 DIAGNOSIS — G62.0 CHEMOTHERAPY-INDUCED PERIPHERAL NEUROPATHY (HCC): ICD-10-CM

## 2023-07-11 DIAGNOSIS — G89.29 CHRONIC MIDLINE THORACIC BACK PAIN: ICD-10-CM

## 2023-07-11 NOTE — TELEPHONE ENCOUNTER
Patient called, requested refill.        Next Office Visit Date:  Future Appointments   Date Time Provider [address]   7/25/2023  2:30 PM MD Janneth Manley please review via [phone]

## 2023-07-12 RX ORDER — HYDROCODONE BITARTRATE AND ACETAMINOPHEN 7.5; 325 MG/1; MG/1
1 TABLET ORAL EVERY 6 HOURS PRN
Qty: 90 TABLET | Refills: 0 | Status: SHIPPED | OUTPATIENT
Start: 2023-07-12 | End: 2023-08-11

## 2023-07-25 ENCOUNTER — OFFICE VISIT (OUTPATIENT)
Dept: FAMILY MEDICINE CLINIC | Age: 23
End: 2023-07-25
Payer: MEDICAID

## 2023-07-25 ENCOUNTER — HOSPITAL ENCOUNTER (OUTPATIENT)
Facility: HOSPITAL | Age: 23
Discharge: HOME | End: 2023-07-25
Payer: MEDICAID

## 2023-07-25 VITALS
HEIGHT: 71 IN | RESPIRATION RATE: 18 BRPM | OXYGEN SATURATION: 98 % | DIASTOLIC BLOOD PRESSURE: 62 MMHG | SYSTOLIC BLOOD PRESSURE: 102 MMHG | BODY MASS INDEX: 21.14 KG/M2 | HEART RATE: 53 BPM | WEIGHT: 151 LBS | TEMPERATURE: 98.1 F

## 2023-07-25 DIAGNOSIS — E83.39 ACQUIRED HYPOPHOSPHATEMIA: ICD-10-CM

## 2023-07-25 DIAGNOSIS — M54.50 CHRONIC MIDLINE LOW BACK PAIN WITHOUT SCIATICA: ICD-10-CM

## 2023-07-25 DIAGNOSIS — F32.9 REACTIVE DEPRESSION: ICD-10-CM

## 2023-07-25 DIAGNOSIS — R53.83 OTHER FATIGUE: ICD-10-CM

## 2023-07-25 DIAGNOSIS — E83.42 HYPOMAGNESEMIA: ICD-10-CM

## 2023-07-25 DIAGNOSIS — G62.0 CHEMOTHERAPY-INDUCED PERIPHERAL NEUROPATHY (HCC): Primary | ICD-10-CM

## 2023-07-25 DIAGNOSIS — T45.1X5A CHEMOTHERAPY-INDUCED PERIPHERAL NEUROPATHY (HCC): Primary | ICD-10-CM

## 2023-07-25 DIAGNOSIS — M54.6 CHRONIC MIDLINE THORACIC BACK PAIN: ICD-10-CM

## 2023-07-25 DIAGNOSIS — G89.29 CHRONIC MIDLINE LOW BACK PAIN WITHOUT SCIATICA: ICD-10-CM

## 2023-07-25 DIAGNOSIS — G89.29 CHRONIC MIDLINE THORACIC BACK PAIN: ICD-10-CM

## 2023-07-25 PROCEDURE — G8420 CALC BMI NORM PARAMETERS: HCPCS | Performed by: FAMILY MEDICINE

## 2023-07-25 PROCEDURE — 85027 COMPLETE CBC AUTOMATED: CPT

## 2023-07-25 PROCEDURE — G8427 DOCREV CUR MEDS BY ELIG CLIN: HCPCS | Performed by: FAMILY MEDICINE

## 2023-07-25 PROCEDURE — 84443 ASSAY THYROID STIM HORMONE: CPT

## 2023-07-25 PROCEDURE — 99214 OFFICE O/P EST MOD 30 MIN: CPT | Performed by: FAMILY MEDICINE

## 2023-07-25 PROCEDURE — 84100 ASSAY OF PHOSPHORUS: CPT

## 2023-07-25 PROCEDURE — 83735 ASSAY OF MAGNESIUM: CPT

## 2023-07-25 PROCEDURE — 80053 COMPREHEN METABOLIC PANEL: CPT

## 2023-07-25 PROCEDURE — 84439 ASSAY OF FREE THYROXINE: CPT

## 2023-07-25 PROCEDURE — 1036F TOBACCO NON-USER: CPT | Performed by: FAMILY MEDICINE

## 2023-07-25 RX ORDER — HYDROCODONE BITARTRATE AND ACETAMINOPHEN 7.5; 325 MG/1; MG/1
1 TABLET ORAL EVERY 6 HOURS PRN
Qty: 90 TABLET | Refills: 0 | Status: SHIPPED | OUTPATIENT
Start: 2023-07-25 | End: 2023-08-24

## 2023-07-25 RX ORDER — GABAPENTIN 100 MG/1
CAPSULE ORAL
Qty: 90 CAPSULE | Refills: 2 | Status: SHIPPED | OUTPATIENT
Start: 2023-07-25 | End: 2023-10-29

## 2023-07-25 RX ORDER — TRAZODONE HYDROCHLORIDE 50 MG/1
TABLET ORAL
Qty: 60 TABLET | Refills: 3 | Status: SHIPPED | OUTPATIENT
Start: 2023-07-25

## 2023-07-25 RX ORDER — HYDROCODONE BITARTRATE AND ACETAMINOPHEN 7.5; 325 MG/1; MG/1
1 TABLET ORAL EVERY 6 HOURS PRN
Qty: 90 TABLET | Refills: 0 | Status: SHIPPED | OUTPATIENT
Start: 2023-07-25 | End: 2023-07-25 | Stop reason: SDUPTHER

## 2023-07-25 ASSESSMENT — ENCOUNTER SYMPTOMS
RESPIRATORY NEGATIVE: 1
EYES NEGATIVE: 1
GASTROINTESTINAL NEGATIVE: 1

## 2023-07-26 LAB
ALBUMIN SERPL-MCNC: 4.5 G/DL (ref 3.4–4.8)
ALBUMIN/GLOB SERPL: 2.1 {RATIO} (ref 0.8–2)
ALP SERPL-CCNC: 145 U/L (ref 25–100)
ALT SERPL-CCNC: 27 U/L (ref 4–36)
ANION GAP SERPL CALCULATED.3IONS-SCNC: 12 MMOL/L (ref 3–16)
AST SERPL-CCNC: 24 U/L (ref 8–33)
BILIRUB SERPL-MCNC: 0.4 MG/DL (ref 0.3–1.2)
BUN SERPL-MCNC: 19 MG/DL (ref 6–20)
CALCIUM SERPL-MCNC: 9.5 MG/DL (ref 8.5–10.5)
CHLORIDE SERPL-SCNC: 105 MMOL/L (ref 98–107)
CO2 SERPL-SCNC: 26 MMOL/L (ref 20–30)
CREAT SERPL-MCNC: 0.6 MG/DL (ref 0.4–1.2)
ERYTHROCYTE [DISTWIDTH] IN BLOOD BY AUTOMATED COUNT: 11.9 % (ref 11–16)
GFR SERPLBLD CREATININE-BSD FMLA CKD-EPI: >60 ML/MIN/{1.73_M2}
GLOBULIN SER CALC-MCNC: 2.1 G/DL
GLUCOSE SERPL-MCNC: 82 MG/DL (ref 74–106)
HCT VFR BLD AUTO: 46.1 % (ref 40–54)
HGB BLD-MCNC: 15.4 G/DL (ref 13–18)
MAGNESIUM SERPL-MCNC: 1.8 MG/DL (ref 1.7–2.4)
MCH RBC QN AUTO: 31.5 PG (ref 27–32)
MCHC RBC AUTO-ENTMCNC: 33.4 G/DL (ref 31–35)
MCV RBC AUTO: 94.3 FL (ref 80–100)
PHOSPHATE SERPL-MCNC: 4.6 MG/DL (ref 2.5–4.5)
PLATELET # BLD AUTO: 167 K/UL (ref 150–400)
PMV BLD AUTO: 10.1 FL (ref 6–10)
POTASSIUM SERPL-SCNC: 4.5 MMOL/L (ref 3.4–5.1)
PROT SERPL-MCNC: 6.6 G/DL (ref 6.4–8.3)
RBC # BLD AUTO: 4.89 M/UL (ref 4.5–6)
SODIUM SERPL-SCNC: 143 MMOL/L (ref 136–145)
T4 FREE SERPL-MCNC: 0.95 NG/DL (ref 0.89–1.76)
TSH SERPL DL<=0.005 MIU/L-ACNC: 1.27 UIU/ML (ref 0.27–4.2)
WBC # BLD AUTO: 6 K/UL (ref 4–11)

## 2023-07-26 RX ORDER — MAGNESIUM OXIDE 400 MG/1
400 TABLET ORAL DAILY
Qty: 30 TABLET | Refills: 1 | Status: SHIPPED | OUTPATIENT
Start: 2023-07-26

## 2023-09-06 ENCOUNTER — TELEPHONE (OUTPATIENT)
Dept: FAMILY MEDICINE CLINIC | Age: 23
End: 2023-09-06

## 2023-09-06 DIAGNOSIS — G89.29 CHRONIC MIDLINE THORACIC BACK PAIN: ICD-10-CM

## 2023-09-06 DIAGNOSIS — G62.0 CHEMOTHERAPY-INDUCED PERIPHERAL NEUROPATHY (HCC): ICD-10-CM

## 2023-09-06 DIAGNOSIS — G89.29 CHRONIC MIDLINE LOW BACK PAIN WITHOUT SCIATICA: ICD-10-CM

## 2023-09-06 DIAGNOSIS — M54.50 CHRONIC MIDLINE LOW BACK PAIN WITHOUT SCIATICA: ICD-10-CM

## 2023-09-06 DIAGNOSIS — T45.1X5A CHEMOTHERAPY-INDUCED PERIPHERAL NEUROPATHY (HCC): ICD-10-CM

## 2023-09-06 DIAGNOSIS — M54.6 CHRONIC MIDLINE THORACIC BACK PAIN: ICD-10-CM

## 2023-09-06 RX ORDER — DOXEPIN HYDROCHLORIDE 10 MG/1
10 CAPSULE ORAL NIGHTLY
Qty: 30 CAPSULE | Refills: 3 | Status: SHIPPED | OUTPATIENT
Start: 2023-09-06

## 2023-09-06 NOTE — TELEPHONE ENCOUNTER
Meldon Homans called and stated that Mauro Reyes has been experiencing insomnia. She stated that he hasn't slept much the last 4 nights. He was taking the trazodone but it was causing vivid nightmares and he has stopped taking it. She was wondering if there is something else you could send in for him?

## 2023-09-20 ENCOUNTER — HOSPITAL ENCOUNTER (EMERGENCY)
Facility: HOSPITAL | Age: 23
Discharge: HOME | End: 2023-09-20

## 2023-09-22 ENCOUNTER — HOSPITAL ENCOUNTER (EMERGENCY)
Facility: HOSPITAL | Age: 23
Discharge: HOME | End: 2023-09-22
Attending: EMERGENCY MEDICINE
Payer: COMMERCIAL

## 2023-09-22 VITALS
HEIGHT: 71 IN | DIASTOLIC BLOOD PRESSURE: 78 MMHG | WEIGHT: 145 LBS | RESPIRATION RATE: 19 BRPM | BODY MASS INDEX: 20.3 KG/M2 | SYSTOLIC BLOOD PRESSURE: 110 MMHG | TEMPERATURE: 98.3 F | OXYGEN SATURATION: 98 % | HEART RATE: 65 BPM

## 2023-09-22 DIAGNOSIS — S39.012A LUMBOSACRAL STRAIN, INITIAL ENCOUNTER: Primary | ICD-10-CM

## 2023-09-22 PROCEDURE — 99283 EMERGENCY DEPT VISIT LOW MDM: CPT

## 2023-09-22 RX ORDER — IBUPROFEN 800 MG/1
800 TABLET ORAL EVERY 8 HOURS PRN
Qty: 30 TABLET | Refills: 0 | Status: SHIPPED | OUTPATIENT
Start: 2023-09-22

## 2023-09-22 RX ORDER — CYCLOBENZAPRINE HCL 10 MG
10 TABLET ORAL 3 TIMES DAILY PRN
Qty: 30 TABLET | Refills: 0 | Status: SHIPPED | OUTPATIENT
Start: 2023-09-22 | End: 2023-10-02

## 2023-09-22 ASSESSMENT — PAIN SCALES - GENERAL: PAINLEVEL_OUTOF10: 5

## 2023-09-22 ASSESSMENT — LIFESTYLE VARIABLES
HOW OFTEN DO YOU HAVE A DRINK CONTAINING ALCOHOL: NEVER
HOW MANY STANDARD DRINKS CONTAINING ALCOHOL DO YOU HAVE ON A TYPICAL DAY: PATIENT DOES NOT DRINK

## 2023-09-22 ASSESSMENT — PAIN DESCRIPTION - FREQUENCY: FREQUENCY: CONTINUOUS

## 2023-09-22 ASSESSMENT — PAIN DESCRIPTION - LOCATION: LOCATION: BACK;NECK

## 2023-09-22 ASSESSMENT — PAIN DESCRIPTION - DESCRIPTORS: DESCRIPTORS: ACHING;THROBBING

## 2023-09-22 ASSESSMENT — PAIN - FUNCTIONAL ASSESSMENT: PAIN_FUNCTIONAL_ASSESSMENT: 0-10

## 2023-09-22 ASSESSMENT — PAIN DESCRIPTION - PAIN TYPE: TYPE: ACUTE PAIN
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
## 2023-09-22 NOTE — ED NOTES
AVS and Rx reviewed with patient, understanding verbalized. Patient discharged home with no further needs or concerns voiced. PCP follow up recommended.      Susan Damon RN  09/22/23 2363
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
## 2023-10-03 DIAGNOSIS — R11.0 NAUSEA: ICD-10-CM

## 2023-10-04 ENCOUNTER — OFFICE VISIT (OUTPATIENT)
Dept: FAMILY MEDICINE CLINIC | Age: 23
End: 2023-10-04
Payer: OTHER MISCELLANEOUS

## 2023-10-04 DIAGNOSIS — S13.4XXD WHIPLASH INJURY TO NECK, SUBSEQUENT ENCOUNTER: Primary | ICD-10-CM

## 2023-10-04 PROCEDURE — 1036F TOBACCO NON-USER: CPT | Performed by: FAMILY MEDICINE

## 2023-10-04 PROCEDURE — G8484 FLU IMMUNIZE NO ADMIN: HCPCS | Performed by: FAMILY MEDICINE

## 2023-10-04 PROCEDURE — G8420 CALC BMI NORM PARAMETERS: HCPCS | Performed by: FAMILY MEDICINE

## 2023-10-04 PROCEDURE — 99213 OFFICE O/P EST LOW 20 MIN: CPT | Performed by: FAMILY MEDICINE

## 2023-10-04 PROCEDURE — G8427 DOCREV CUR MEDS BY ELIG CLIN: HCPCS | Performed by: FAMILY MEDICINE

## 2023-10-04 NOTE — TELEPHONE ENCOUNTER
Patient called, requested refill.        Next Office Visit Date:  Future Appointments   Date Time Provider [address]   10/4/2023  3:45 PM MD SHIRLEY Marr [facility] Blood   10/25/2023  1:45 PM MD Janneth Marr please review via [phone]

## 2023-10-09 RX ORDER — PROMETHAZINE HYDROCHLORIDE 25 MG/1
TABLET ORAL
Qty: 30 TABLET | Refills: 5 | Status: SHIPPED | OUTPATIENT
Start: 2023-10-09

## 2023-10-11 DIAGNOSIS — M54.50 CHRONIC MIDLINE LOW BACK PAIN WITHOUT SCIATICA: ICD-10-CM

## 2023-10-11 DIAGNOSIS — G89.29 CHRONIC MIDLINE LOW BACK PAIN WITHOUT SCIATICA: ICD-10-CM

## 2023-10-11 DIAGNOSIS — G62.0 CHEMOTHERAPY-INDUCED PERIPHERAL NEUROPATHY (HCC): ICD-10-CM

## 2023-10-11 DIAGNOSIS — T45.1X5A CHEMOTHERAPY-INDUCED PERIPHERAL NEUROPATHY (HCC): ICD-10-CM

## 2023-10-11 DIAGNOSIS — M54.6 CHRONIC MIDLINE THORACIC BACK PAIN: ICD-10-CM

## 2023-10-11 DIAGNOSIS — G89.29 CHRONIC MIDLINE THORACIC BACK PAIN: ICD-10-CM

## 2023-10-11 NOTE — TELEPHONE ENCOUNTER
Patient called, requested refill.        Next Office Visit Date:  Future Appointments   Date Time Provider [address]   27/81/7809 01:32 AM Gena Swann, PT [facility]   10/25/2023  1:45 PM MD Janneth Levi please review via [phone]

## 2023-10-12 RX ORDER — HYDROCODONE BITARTRATE AND ACETAMINOPHEN 7.5; 325 MG/1; MG/1
1 TABLET ORAL EVERY 6 HOURS PRN
Qty: 90 TABLET | Refills: 0 | Status: SHIPPED | OUTPATIENT
Start: 2023-10-12 | End: 2023-10-25 | Stop reason: SDUPTHER

## 2023-10-16 ASSESSMENT — ENCOUNTER SYMPTOMS
GASTROINTESTINAL NEGATIVE: 1
RESPIRATORY NEGATIVE: 1
EYES NEGATIVE: 1
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
## 2023-10-25 ENCOUNTER — OFFICE VISIT (OUTPATIENT)
Dept: FAMILY MEDICINE CLINIC | Age: 23
End: 2023-10-25
Payer: MEDICAID

## 2023-10-25 VITALS
HEIGHT: 71 IN | OXYGEN SATURATION: 96 % | HEART RATE: 55 BPM | WEIGHT: 151.4 LBS | SYSTOLIC BLOOD PRESSURE: 106 MMHG | DIASTOLIC BLOOD PRESSURE: 64 MMHG | RESPIRATION RATE: 16 BRPM | BODY MASS INDEX: 21.19 KG/M2 | TEMPERATURE: 98.4 F

## 2023-10-25 DIAGNOSIS — G89.29 CHRONIC MIDLINE THORACIC BACK PAIN: ICD-10-CM

## 2023-10-25 DIAGNOSIS — G47.00 INSOMNIA, UNSPECIFIED TYPE: Primary | ICD-10-CM

## 2023-10-25 DIAGNOSIS — M54.50 CHRONIC MIDLINE LOW BACK PAIN WITHOUT SCIATICA: ICD-10-CM

## 2023-10-25 DIAGNOSIS — G89.29 CHRONIC MIDLINE LOW BACK PAIN WITHOUT SCIATICA: ICD-10-CM

## 2023-10-25 DIAGNOSIS — M54.6 CHRONIC MIDLINE THORACIC BACK PAIN: ICD-10-CM

## 2023-10-25 DIAGNOSIS — T45.1X5A CHEMOTHERAPY-INDUCED PERIPHERAL NEUROPATHY (HCC): ICD-10-CM

## 2023-10-25 DIAGNOSIS — G62.0 CHEMOTHERAPY-INDUCED PERIPHERAL NEUROPATHY (HCC): ICD-10-CM

## 2023-10-25 DIAGNOSIS — E83.42 HYPOMAGNESEMIA: ICD-10-CM

## 2023-10-25 PROCEDURE — G8484 FLU IMMUNIZE NO ADMIN: HCPCS | Performed by: FAMILY MEDICINE

## 2023-10-25 PROCEDURE — G8420 CALC BMI NORM PARAMETERS: HCPCS | Performed by: FAMILY MEDICINE

## 2023-10-25 PROCEDURE — 99214 OFFICE O/P EST MOD 30 MIN: CPT | Performed by: FAMILY MEDICINE

## 2023-10-25 PROCEDURE — 1036F TOBACCO NON-USER: CPT | Performed by: FAMILY MEDICINE

## 2023-10-25 PROCEDURE — G8427 DOCREV CUR MEDS BY ELIG CLIN: HCPCS | Performed by: FAMILY MEDICINE

## 2023-10-25 RX ORDER — MAGNESIUM OXIDE 400 MG/1
400 TABLET ORAL DAILY
Qty: 30 TABLET | Refills: 1 | Status: SHIPPED | OUTPATIENT
Start: 2023-10-25

## 2023-10-25 RX ORDER — DOXEPIN HYDROCHLORIDE 10 MG/1
10 CAPSULE ORAL NIGHTLY
Qty: 90 CAPSULE | Refills: 1 | Status: SHIPPED | OUTPATIENT
Start: 2023-10-25

## 2023-10-25 RX ORDER — IBUPROFEN 800 MG/1
800 TABLET ORAL EVERY 8 HOURS PRN
Qty: 30 TABLET | Refills: 0 | Status: SHIPPED | OUTPATIENT
Start: 2023-10-25

## 2023-10-25 RX ORDER — GABAPENTIN 100 MG/1
CAPSULE ORAL
Qty: 90 CAPSULE | Refills: 2 | Status: SHIPPED | OUTPATIENT
Start: 2023-10-25 | End: 2023-10-25

## 2023-10-25 RX ORDER — HYDROCODONE BITARTRATE AND ACETAMINOPHEN 7.5; 325 MG/1; MG/1
1 TABLET ORAL EVERY 6 HOURS PRN
Qty: 90 TABLET | Refills: 0 | Status: SHIPPED | OUTPATIENT
Start: 2023-10-25 | End: 2023-11-24

## 2023-10-25 ASSESSMENT — ENCOUNTER SYMPTOMS
EYES NEGATIVE: 1
RESPIRATORY NEGATIVE: 1
GASTROINTESTINAL NEGATIVE: 1

## 2023-10-25 NOTE — PROGRESS NOTES
Chief Complaint   Patient presents with    ADHD     Reg F/U    Anxiety    Back Pain     Patient is going to PT to help strengthen the lower back       Have you seen any other physician or provider since your last visit no    Have you had any other diagnostic tests since your last visit? no    Have you changed or stopped any medications since your last visit? no

## 2023-11-02 DIAGNOSIS — E55.9 VITAMIN D DEFICIENCY: ICD-10-CM

## 2023-11-02 NOTE — TELEPHONE ENCOUNTER
Patient called, requested refill.        Next Office Visit Date:  Future Appointments   Date Time Provider [address]   1/25/2024  2:15 PM MD Janneth Muñoz please review via [address]

## 2023-11-03 RX ORDER — OMEGA-3S/DHA/EPA/FISH OIL/D3 300MG-1000
400 CAPSULE ORAL DAILY
Qty: 30 TABLET | Refills: 5 | Status: SHIPPED | OUTPATIENT
Start: 2023-11-03

## 2023-11-14 DIAGNOSIS — S13.4XXD WHIPLASH INJURY TO NECK, SUBSEQUENT ENCOUNTER: Primary | ICD-10-CM

## 2023-12-04 ENCOUNTER — HOSPITAL ENCOUNTER (OUTPATIENT)
Dept: PHYSICAL THERAPY | Facility: HOSPITAL | Age: 23
Setting detail: THERAPIES SERIES
Discharge: HOME | End: 2023-12-04
Payer: OTHER MISCELLANEOUS

## 2023-12-04 PROCEDURE — 97161 PT EVAL LOW COMPLEX 20 MIN: CPT

## 2023-12-04 PROCEDURE — 97110 THERAPEUTIC EXERCISES: CPT

## 2023-12-04 PROCEDURE — 97140 MANUAL THERAPY 1/> REGIONS: CPT

## 2023-12-04 ASSESSMENT — PAIN SCALES - GENERAL: PAINLEVEL_OUTOF10: 2

## 2023-12-04 ASSESSMENT — PAIN DESCRIPTION - PAIN TYPE: TYPE: ACUTE PAIN

## 2023-12-04 ASSESSMENT — PAIN DESCRIPTION - LOCATION: LOCATION: NECK

## 2023-12-04 NOTE — PROGRESS NOTES
Pt to demonstrate full pain free lumbar and cervical spine AROM. Pt to report a 90% or greater reduction in frequency and intensity of cervical headaches. Pt to perform daily activities with cervical spine pain of 1/10 or less at greatest.     Pt to be transitioned to an advanced self care HEP. TREATMENT PLAN       Requires PT Follow-Up: Yes    Pt. actively involved in establishing Plan of Care and Goals: Yes  Patient/ Caregiver education and instruction:       HEP        Treatment may include any combination of the following: Current Treatment Recommendations: Strengthening, ROM, Pain management, Manual, Home exercise program, Patient/Caregiver education & training, Modalities, Dry needling, Therapeutic activities  Modalities: Heat/Cold, E-stim - unattended     Frequency / Duration:  Patient to be seen 2x/week for 6-8 weeks weeks      Eval Complexity:    Decision Making: Low Complexity    PT Treatment Completed:  Exercises:      Treatment Reasoning    Exercise 1: LTR 3x10  Exercise 2: SKTC 5x10\"  Exercise 3: UT stretch 5x20\"  Exercise 4: LS stretch 5x20\"  Exercise 5: Chin tucks 3x10  Exercise 6: Scapular retraction 3x10  Exercise 7: Supine chin tuck w/ towel roll 3x10     ROM, flexibility, posture correction                     Manual Therapy: (CPT 07186) Treatment Reasoning     Manual Traction: Gentle cervical traction, OA release, lateral cervical glides Limitations addressed: Joint motion, Tissue extensibility                     Therapist Signature: Ankush Jack PT    Date: 41/5/7363     I certify that the above Therapy Services are being furnished while the patient is under my care. I agree with the treatment plan and certify that this therapy is necessary.       Physician's Signature:  ___________________________   Date:_______                                                                   Giovana Peter MD        Physician Comments:

## 2023-12-06 ENCOUNTER — HOSPITAL ENCOUNTER (OUTPATIENT)
Dept: PHYSICAL THERAPY | Facility: HOSPITAL | Age: 23
Setting detail: THERAPIES SERIES
Discharge: HOME | End: 2023-12-06

## 2023-12-06 NOTE — CARE COORDINATION
[address]  Cancel/No Show Note    Service:  [x]Physical Therapy  []Occupational Therapy    Date: 12/6/23    # of Missed Tx:     Reason For Missed Treatment:  [x]Illness  []Provider unavailable  []No call/no show  []Late cancellation (<24 hours)  []Other appointment  []Scheduling conflict  []Arrived on wrong day  []Physician discharged tx  []Transportation conflict  []Inclement weather  []Frequency of tx change  []No reason given  []Arrived but refused participation  []Unable to tolerate  []Medical complications  []Pt on hold due to-  []Other-     Plan:  [x]Rescheduled  []Continue at next scheduled apt  []Reminder phone call  []Contacted referring provider  []Discharged

## 2023-12-07 DIAGNOSIS — T45.1X5A CHEMOTHERAPY-INDUCED PERIPHERAL NEUROPATHY (HCC): ICD-10-CM

## 2023-12-07 DIAGNOSIS — M54.6 CHRONIC MIDLINE THORACIC BACK PAIN: ICD-10-CM

## 2023-12-07 DIAGNOSIS — G89.29 CHRONIC MIDLINE THORACIC BACK PAIN: ICD-10-CM

## 2023-12-07 DIAGNOSIS — G62.0 CHEMOTHERAPY-INDUCED PERIPHERAL NEUROPATHY (HCC): ICD-10-CM

## 2023-12-07 DIAGNOSIS — M54.50 CHRONIC MIDLINE LOW BACK PAIN WITHOUT SCIATICA: ICD-10-CM

## 2023-12-07 DIAGNOSIS — G89.29 CHRONIC MIDLINE LOW BACK PAIN WITHOUT SCIATICA: ICD-10-CM

## 2023-12-07 NOTE — TELEPHONE ENCOUNTER
Refill request received from patient      Next Office Visit Date:  Future Appointments   Date Time Provider [address]   12/12/2023  1:30 PM Martín Gay PTA [facility]   1/25/2024  2:15 PM MD Janneth George - Please review via PDMP        Last Office Visit:    10/25/2023

## 2023-12-08 RX ORDER — HYDROCODONE BITARTRATE AND ACETAMINOPHEN 7.5; 325 MG/1; MG/1
1 TABLET ORAL EVERY 6 HOURS PRN
Qty: 90 TABLET | Refills: 0 | Status: SHIPPED | OUTPATIENT
Start: 2023-12-08 | End: 2024-01-07

## 2023-12-11 NOTE — TELEPHONE ENCOUNTER
Refill request received from pharmacy      Next Office Visit Date:  Future Appointments   Date Time Provider [address]   12/12/2023  1:30 PM Pia Smallwood PTA [facility]   1/25/2024  2:15 PM MD Janneth Chappell - Please review via PDMP        Last Office Visit:    10/25/2023

## 2023-12-12 ENCOUNTER — HOSPITAL ENCOUNTER (OUTPATIENT)
Dept: PHYSICAL THERAPY | Facility: HOSPITAL | Age: 23
Setting detail: THERAPIES SERIES
Discharge: HOME | End: 2023-12-12

## 2023-12-12 NOTE — CARE COORDINATION
[address]  Cancel/No Show Note    Service:  [x]Physical Therapy  []Occupational Therapy    Date: 12/12/23    # of Missed Tx:     Reason For Missed Treatment:  []Illness  []Provider unavailable  []No call/no show  []Late cancellation (<24 hours)  []Other appointment  []Scheduling conflict  []Arrived on wrong day  []Physician discharged tx  [x]Transportation conflict  []Inclement weather  []Frequency of tx change  []No reason given  []Arrived but refused participation  []Unable to tolerate  []Medical complications  []Pt on hold due to-  []Other-     Plan:  []Rescheduled  []Continue at next scheduled apt  []Reminder phone call  []Contacted referring provider  []Discharged

## 2023-12-13 DIAGNOSIS — G62.0 CHEMOTHERAPY-INDUCED PERIPHERAL NEUROPATHY (HCC): ICD-10-CM

## 2023-12-13 DIAGNOSIS — T45.1X5A CHEMOTHERAPY-INDUCED PERIPHERAL NEUROPATHY (HCC): ICD-10-CM

## 2023-12-13 DIAGNOSIS — M54.6 CHRONIC MIDLINE THORACIC BACK PAIN: ICD-10-CM

## 2023-12-13 DIAGNOSIS — M54.50 CHRONIC MIDLINE LOW BACK PAIN WITHOUT SCIATICA: Primary | ICD-10-CM

## 2023-12-13 DIAGNOSIS — G89.29 CHRONIC MIDLINE THORACIC BACK PAIN: ICD-10-CM

## 2023-12-13 DIAGNOSIS — G89.29 CHRONIC MIDLINE LOW BACK PAIN WITHOUT SCIATICA: Primary | ICD-10-CM

## 2023-12-13 RX ORDER — HYDROCODONE BITARTRATE AND ACETAMINOPHEN 7.5; 3 MG/1; MG/1
1 TABLET ORAL EVERY 6 HOURS PRN
Qty: 90 TABLET | Refills: 0 | Status: SHIPPED | OUTPATIENT
Start: 2023-12-13 | End: 2024-01-12

## 2023-12-14 RX ORDER — TRAZODONE HYDROCHLORIDE 50 MG/1
TABLET ORAL
Qty: 60 TABLET | Refills: 3 | Status: SHIPPED | OUTPATIENT
Start: 2023-12-14

## 2024-01-09 ENCOUNTER — TELEPHONE (OUTPATIENT)
Dept: FAMILY MEDICINE CLINIC | Age: 24
End: 2024-01-09

## 2024-01-09 RX ORDER — AZITHROMYCIN 250 MG/1
250 TABLET, FILM COATED ORAL SEE ADMIN INSTRUCTIONS
Qty: 6 TABLET | Refills: 0 | Status: SHIPPED | OUTPATIENT
Start: 2024-01-09 | End: 2024-01-14

## 2024-01-09 RX ORDER — BENZONATATE 200 MG/1
200 CAPSULE ORAL 3 TIMES DAILY PRN
Qty: 30 CAPSULE | Refills: 0 | Status: SHIPPED | OUTPATIENT
Start: 2024-01-09 | End: 2024-01-16

## 2024-01-09 NOTE — TELEPHONE ENCOUNTER
Esther called and stated that Miguel A is now experiencing cough and congestion and she is wondering if you could send him in something for it?

## 2024-01-25 ENCOUNTER — OFFICE VISIT (OUTPATIENT)
Dept: FAMILY MEDICINE CLINIC | Age: 24
End: 2024-01-25
Payer: MEDICAID

## 2024-01-25 VITALS
BODY MASS INDEX: 21.9 KG/M2 | SYSTOLIC BLOOD PRESSURE: 124 MMHG | OXYGEN SATURATION: 98 % | DIASTOLIC BLOOD PRESSURE: 80 MMHG | HEART RATE: 65 BPM | HEIGHT: 71 IN | WEIGHT: 156.4 LBS | RESPIRATION RATE: 16 BRPM | TEMPERATURE: 97.9 F

## 2024-01-25 DIAGNOSIS — G89.29 CHRONIC MIDLINE THORACIC BACK PAIN: ICD-10-CM

## 2024-01-25 DIAGNOSIS — M54.6 CHRONIC MIDLINE THORACIC BACK PAIN: ICD-10-CM

## 2024-01-25 DIAGNOSIS — M54.50 CHRONIC MIDLINE LOW BACK PAIN WITHOUT SCIATICA: ICD-10-CM

## 2024-01-25 DIAGNOSIS — G47.00 INSOMNIA, UNSPECIFIED TYPE: ICD-10-CM

## 2024-01-25 DIAGNOSIS — T45.1X5A CHEMOTHERAPY-INDUCED PERIPHERAL NEUROPATHY (HCC): ICD-10-CM

## 2024-01-25 DIAGNOSIS — G89.29 CHRONIC MIDLINE LOW BACK PAIN WITHOUT SCIATICA: ICD-10-CM

## 2024-01-25 DIAGNOSIS — G62.0 CHEMOTHERAPY-INDUCED PERIPHERAL NEUROPATHY (HCC): ICD-10-CM

## 2024-01-25 DIAGNOSIS — H65.01 RIGHT ACUTE SEROUS OTITIS MEDIA, RECURRENCE NOT SPECIFIED: ICD-10-CM

## 2024-01-25 DIAGNOSIS — M79.672 LEFT FOOT PAIN: Primary | ICD-10-CM

## 2024-01-25 PROCEDURE — 99214 OFFICE O/P EST MOD 30 MIN: CPT | Performed by: FAMILY MEDICINE

## 2024-01-25 PROCEDURE — G8420 CALC BMI NORM PARAMETERS: HCPCS | Performed by: FAMILY MEDICINE

## 2024-01-25 PROCEDURE — G8427 DOCREV CUR MEDS BY ELIG CLIN: HCPCS | Performed by: FAMILY MEDICINE

## 2024-01-25 PROCEDURE — G8484 FLU IMMUNIZE NO ADMIN: HCPCS | Performed by: FAMILY MEDICINE

## 2024-01-25 PROCEDURE — 1036F TOBACCO NON-USER: CPT | Performed by: FAMILY MEDICINE

## 2024-01-25 RX ORDER — HYDROCODONE BITARTRATE AND ACETAMINOPHEN 7.5; 325 MG/1; MG/1
1 TABLET ORAL EVERY 6 HOURS PRN
COMMUNITY
Start: 2024-01-09 | End: 2024-01-25 | Stop reason: SDUPTHER

## 2024-01-25 RX ORDER — CHOLECALCIFEROL (VITAMIN D3) 125 MCG
2 CAPSULE ORAL NIGHTLY PRN
COMMUNITY
Start: 2024-01-05

## 2024-01-25 RX ORDER — HYDROCODONE BITARTRATE AND ACETAMINOPHEN 7.5; 325 MG/1; MG/1
1 TABLET ORAL EVERY 6 HOURS PRN
Qty: 120 TABLET | Refills: 0 | Status: SHIPPED | OUTPATIENT
Start: 2024-01-25 | End: 2024-02-24

## 2024-01-25 RX ORDER — AMOXICILLIN AND CLAVULANATE POTASSIUM 875; 125 MG/1; MG/1
1 TABLET, FILM COATED ORAL 2 TIMES DAILY
Qty: 14 TABLET | Refills: 0 | Status: SHIPPED | OUTPATIENT
Start: 2024-01-25 | End: 2024-02-01

## 2024-01-25 RX ORDER — HYDROCODONE BITARTRATE AND ACETAMINOPHEN 7.5; 325 MG/1; MG/1
1 TABLET ORAL EVERY 6 HOURS PRN
Qty: 120 TABLET | Refills: 0 | Status: SHIPPED | OUTPATIENT
Start: 2024-01-25 | End: 2024-01-25 | Stop reason: SDUPTHER

## 2024-01-25 ASSESSMENT — PATIENT HEALTH QUESTIONNAIRE - PHQ9
6. FEELING BAD ABOUT YOURSELF - OR THAT YOU ARE A FAILURE OR HAVE LET YOURSELF OR YOUR FAMILY DOWN: 0
SUM OF ALL RESPONSES TO PHQ QUESTIONS 1-9: 1
4. FEELING TIRED OR HAVING LITTLE ENERGY: 0
SUM OF ALL RESPONSES TO PHQ QUESTIONS 1-9: 1
1. LITTLE INTEREST OR PLEASURE IN DOING THINGS: 0
9. THOUGHTS THAT YOU WOULD BE BETTER OFF DEAD, OR OF HURTING YOURSELF: 0
3. TROUBLE FALLING OR STAYING ASLEEP: 1
SUM OF ALL RESPONSES TO PHQ QUESTIONS 1-9: 1
SUM OF ALL RESPONSES TO PHQ9 QUESTIONS 1 & 2: 0
2. FEELING DOWN, DEPRESSED OR HOPELESS: 0
10. IF YOU CHECKED OFF ANY PROBLEMS, HOW DIFFICULT HAVE THESE PROBLEMS MADE IT FOR YOU TO DO YOUR WORK, TAKE CARE OF THINGS AT HOME, OR GET ALONG WITH OTHER PEOPLE: 0
5. POOR APPETITE OR OVEREATING: 0
SUM OF ALL RESPONSES TO PHQ QUESTIONS 1-9: 1
8. MOVING OR SPEAKING SO SLOWLY THAT OTHER PEOPLE COULD HAVE NOTICED. OR THE OPPOSITE, BEING SO FIGETY OR RESTLESS THAT YOU HAVE BEEN MOVING AROUND A LOT MORE THAN USUAL: 0
7. TROUBLE CONCENTRATING ON THINGS, SUCH AS READING THE NEWSPAPER OR WATCHING TELEVISION: 0

## 2024-01-25 NOTE — PROGRESS NOTES
Chief Complaint   Patient presents with    Foot Swelling     Bilateral foot swelling, left is worse than the right.    ADHD     Reg F/U       Have you seen any other physician or provider since your last visit no    Have you had any other diagnostic tests since your last visit? no    Have you changed or stopped any medications since your last visit? no     Needs a new referral for PT.    
DISCONTD: HYDROcodone-acetaminophen (NORCO) 7.5-325 MG per tablet     Sig: Take 1 tablet by mouth every 6 hours as needed for Pain for up to 30 days. Max Daily Amount: 4 tablets     Dispense:  120 tablet     Refill:  0     Reduce doses taken as pain becomes manageable    amoxicillin-clavulanate (AUGMENTIN) 875-125 MG per tablet     Sig: Take 1 tablet by mouth 2 times daily for 7 days     Dispense:  14 tablet     Refill:  0    HYDROcodone-acetaminophen (NORCO) 7.5-325 MG per tablet     Sig: Take 1 tablet by mouth every 6 hours as needed for Pain for up to 30 days. To be filled on or after march 9th Max Daily Amount: 4 tablets     Dispense:  120 tablet     Refill:  0     Reduce doses taken as pain becomes manageable        Medications Discontinued During This Encounter   Medication Reason    traZODone (DESYREL) 50 MG tablet LIST CLEANUP    HYDROcodone-acetaminophen (NORCO) 7.5-325 MG per tablet REORDER    HYDROcodone-acetaminophen (NORCO) 7.5-325 MG per tablet REORDER       Controlled Substances Monitoring: Periodic Controlled Substance Monitoring: Possible medication side effects, risk of tolerance/dependence & alternative treatments discussed., No signs of potential drug abuse or diversion identified., Assessed functional status (ability to engage in work or other purposeful activities, the pain intensity and its interference with activities of daily living, quality of family life and social activities, and the physical activity), Obtaining appropriate analgesic effect of treatment. (Erum Valentine MD)    Please note: This chart was generated using Dragon dictation software. Although every effort was made to ensure the accuracy of this automated transcription, some errors in transcription may have occurred.

## 2024-01-26 ENCOUNTER — HOSPITAL ENCOUNTER (OUTPATIENT)
Facility: HOSPITAL | Age: 24
Discharge: HOME | End: 2024-01-26
Payer: MEDICAID

## 2024-01-26 ENCOUNTER — HOSPITAL ENCOUNTER (OUTPATIENT)
Dept: GENERAL RADIOLOGY | Facility: HOSPITAL | Age: 24
Discharge: HOME | End: 2024-01-26
Payer: MEDICAID

## 2024-01-26 DIAGNOSIS — M79.672 LEFT FOOT PAIN: ICD-10-CM

## 2024-01-26 PROCEDURE — 73630 X-RAY EXAM OF FOOT: CPT

## 2024-01-30 DIAGNOSIS — G89.29 CHRONIC MIDLINE LOW BACK PAIN WITHOUT SCIATICA: Primary | ICD-10-CM

## 2024-01-30 DIAGNOSIS — M54.50 CHRONIC MIDLINE LOW BACK PAIN WITHOUT SCIATICA: Primary | ICD-10-CM

## 2024-01-30 DIAGNOSIS — G89.29 CHRONIC MIDLINE THORACIC BACK PAIN: ICD-10-CM

## 2024-01-30 DIAGNOSIS — M54.6 CHRONIC MIDLINE THORACIC BACK PAIN: ICD-10-CM

## 2024-02-07 ENCOUNTER — HOSPITAL ENCOUNTER (OUTPATIENT)
Dept: PHYSICAL THERAPY | Facility: HOSPITAL | Age: 24
Setting detail: THERAPIES SERIES
Discharge: HOME | End: 2024-02-07
Payer: OTHER MISCELLANEOUS

## 2024-02-07 PROCEDURE — 97110 THERAPEUTIC EXERCISES: CPT

## 2024-02-07 PROCEDURE — 97161 PT EVAL LOW COMPLEX 20 MIN: CPT

## 2024-02-07 ASSESSMENT — PAIN DESCRIPTION - ORIENTATION: ORIENTATION: MID;LOWER

## 2024-02-07 ASSESSMENT — PAIN DESCRIPTION - PAIN TYPE: TYPE: CHRONIC PAIN;ACUTE PAIN

## 2024-02-07 ASSESSMENT — PAIN DESCRIPTION - LOCATION: LOCATION: BACK

## 2024-02-07 ASSESSMENT — PAIN SCALES - GENERAL: PAINLEVEL_OUTOF10: 2
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
## 2024-02-08 DIAGNOSIS — M54.2 NECK PAIN: Primary | ICD-10-CM

## 2024-02-08 DIAGNOSIS — M54.50 LOW BACK PAIN, UNSPECIFIED BACK PAIN LATERALITY, UNSPECIFIED CHRONICITY, UNSPECIFIED WHETHER SCIATICA PRESENT: ICD-10-CM

## 2024-02-12 ENCOUNTER — TELEPHONE (OUTPATIENT)
Dept: FAMILY MEDICINE CLINIC | Age: 24
End: 2024-02-12

## 2024-02-12 DIAGNOSIS — T45.1X5A CHEMOTHERAPY-INDUCED PERIPHERAL NEUROPATHY (HCC): Primary | ICD-10-CM

## 2024-02-12 DIAGNOSIS — G89.29 CHRONIC MIDLINE THORACIC BACK PAIN: ICD-10-CM

## 2024-02-12 DIAGNOSIS — G62.0 CHEMOTHERAPY-INDUCED PERIPHERAL NEUROPATHY (HCC): Primary | ICD-10-CM

## 2024-02-12 DIAGNOSIS — M54.50 CHRONIC MIDLINE LOW BACK PAIN WITHOUT SCIATICA: ICD-10-CM

## 2024-02-12 DIAGNOSIS — M54.6 CHRONIC MIDLINE THORACIC BACK PAIN: ICD-10-CM

## 2024-02-12 DIAGNOSIS — M79.672 LEFT FOOT PAIN: ICD-10-CM

## 2024-02-12 DIAGNOSIS — G89.29 CHRONIC MIDLINE LOW BACK PAIN WITHOUT SCIATICA: ICD-10-CM

## 2024-02-12 RX ORDER — HYDROCODONE BITARTRATE AND ACETAMINOPHEN 10; 325 MG/1; MG/1
1 TABLET ORAL EVERY 6 HOURS PRN
Qty: 120 TABLET | Refills: 0 | Status: SHIPPED | OUTPATIENT
Start: 2024-02-12 | End: 2024-03-13

## 2024-02-12 NOTE — TELEPHONE ENCOUNTER
Pharmacy doesn't have the Hydrocodone 7.5 mg in stock. They stated they have the 10 mg if you would like to send that in. Esther stated that he is out of medication today.

## 2024-02-21 ENCOUNTER — HOSPITAL ENCOUNTER (OUTPATIENT)
Dept: PHYSICAL THERAPY | Facility: HOSPITAL | Age: 24
Setting detail: THERAPIES SERIES
Discharge: HOME | End: 2024-02-21
Payer: OTHER MISCELLANEOUS

## 2024-02-21 ENCOUNTER — TELEPHONE (OUTPATIENT)
Dept: FAMILY MEDICINE CLINIC | Age: 24
End: 2024-02-21

## 2024-02-21 DIAGNOSIS — G89.29 CHRONIC MIDLINE THORACIC BACK PAIN: ICD-10-CM

## 2024-02-21 DIAGNOSIS — M25.511 BILATERAL SHOULDER PAIN, UNSPECIFIED CHRONICITY: ICD-10-CM

## 2024-02-21 DIAGNOSIS — S13.4XXD WHIPLASH INJURY TO NECK, SUBSEQUENT ENCOUNTER: ICD-10-CM

## 2024-02-21 DIAGNOSIS — M25.512 BILATERAL SHOULDER PAIN, UNSPECIFIED CHRONICITY: ICD-10-CM

## 2024-02-21 DIAGNOSIS — M54.2 NECK PAIN: Primary | ICD-10-CM

## 2024-02-21 DIAGNOSIS — M54.50 CHRONIC MIDLINE LOW BACK PAIN WITHOUT SCIATICA: ICD-10-CM

## 2024-02-21 DIAGNOSIS — M54.6 CHRONIC MIDLINE THORACIC BACK PAIN: ICD-10-CM

## 2024-02-21 DIAGNOSIS — G89.29 CHRONIC MIDLINE LOW BACK PAIN WITHOUT SCIATICA: ICD-10-CM

## 2024-02-21 PROCEDURE — 97140 MANUAL THERAPY 1/> REGIONS: CPT

## 2024-02-21 PROCEDURE — 97530 THERAPEUTIC ACTIVITIES: CPT

## 2024-02-21 NOTE — TELEPHONE ENCOUNTER
Esther called and stated that Miguel A is experiencing neck and shoulder pain along with leg and back pain. She asked if we could put in a new referral to physical therapy for that. I have placed that and will have the referrals  send it to [facility].
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
## 2024-02-22 ENCOUNTER — HOSPITAL ENCOUNTER (OUTPATIENT)
Dept: GENERAL RADIOLOGY | Facility: HOSPITAL | Age: 24
Discharge: HOME | End: 2024-02-22
Payer: MEDICAID

## 2024-02-22 ENCOUNTER — HOSPITAL ENCOUNTER (OUTPATIENT)
Facility: HOSPITAL | Age: 24
Discharge: HOME | End: 2024-02-22
Payer: MEDICAID

## 2024-02-22 ENCOUNTER — TELEPHONE (OUTPATIENT)
Dept: FAMILY MEDICINE CLINIC | Age: 24
End: 2024-02-22

## 2024-02-22 ENCOUNTER — OFFICE VISIT (OUTPATIENT)
Dept: FAMILY MEDICINE CLINIC | Age: 24
End: 2024-02-22

## 2024-02-22 VITALS
BODY MASS INDEX: 21.76 KG/M2 | HEIGHT: 71 IN | DIASTOLIC BLOOD PRESSURE: 68 MMHG | HEART RATE: 95 BPM | TEMPERATURE: 98.4 F | SYSTOLIC BLOOD PRESSURE: 122 MMHG | RESPIRATION RATE: 18 BRPM | WEIGHT: 155.4 LBS | OXYGEN SATURATION: 96 %

## 2024-02-22 DIAGNOSIS — K21.9 GASTROESOPHAGEAL REFLUX DISEASE WITHOUT ESOPHAGITIS: ICD-10-CM

## 2024-02-22 DIAGNOSIS — M79.671 ACUTE FOOT PAIN, RIGHT: ICD-10-CM

## 2024-02-22 DIAGNOSIS — M79.671 ACUTE FOOT PAIN, RIGHT: Primary | ICD-10-CM

## 2024-02-22 DIAGNOSIS — M25.512 BILATERAL SHOULDER PAIN, UNSPECIFIED CHRONICITY: ICD-10-CM

## 2024-02-22 DIAGNOSIS — M25.511 BILATERAL SHOULDER PAIN, UNSPECIFIED CHRONICITY: ICD-10-CM

## 2024-02-22 PROCEDURE — 73610 X-RAY EXAM OF ANKLE: CPT

## 2024-02-22 PROCEDURE — 73630 X-RAY EXAM OF FOOT: CPT

## 2024-02-22 RX ORDER — PANTOPRAZOLE SODIUM 20 MG/1
TABLET, DELAYED RELEASE ORAL
Qty: 30 TABLET | Refills: 5 | Status: SHIPPED | OUTPATIENT
Start: 2024-02-22

## 2024-02-22 ASSESSMENT — ENCOUNTER SYMPTOMS
GASTROINTESTINAL NEGATIVE: 1
EYES NEGATIVE: 1
RESPIRATORY NEGATIVE: 1
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
## 2024-02-22 NOTE — PROGRESS NOTES
[address]  [place] [address]  Dept: [phone]  Loc: [phone]     SUBJECTIVE:  Miguel A House is a 23 y.o. male that presents with   Chief Complaint   Patient presents with    Foot Pain     Worsened right foot/leg pain since yesterday afternoon. Patient states he was chopping fire wood yesterday and the logs rolled on his feet/legs.   .    HPI:    Miguel A House is a pleasant 23 year old white male who presents to clinic with complaints of right foot pain. He says he was chopping wood yesterday and a log rolled back over on his right foot. The top of his right foot is slightly bruised and he is unable to bear weight on foot. Pain does radiate up right leg. No open wounds. Pulses are +2, cap refill <2 secs.     Foot Pain   The pain is present in the right foot. This is a new problem. The current episode started yesterday. There has been a history of trauma. The problem occurs constantly. The problem has been gradually worsening. The quality of the pain is described as pounding. The pain is moderate. Associated symptoms include an inability to bear weight. Pertinent negatives include no numbness. The symptoms are aggravated by standing.       Review of Systems   Constitutional:  Positive for activity change.   HENT: Negative.     Eyes: Negative.    Respiratory: Negative.     Cardiovascular: Negative.    Gastrointestinal: Negative.    Endocrine: Negative.    Genitourinary: Negative.    Musculoskeletal:         Right foot pain, swelling and bruising to top of foot   Skin:         Bruising top of right foot   Allergic/Immunologic: Positive for immunocompromised state.   Neurological:  Negative for numbness.   Hematological: Negative.    Psychiatric/Behavioral: Negative.          OBJECTIVE:  /68   Pulse 95   Temp 98.4 °F (36.9 °C) (Infrared)   Resp 18   Ht 1.803 m (5' 11\")   Wt 70.5 kg (155 lb 6.4 oz)   SpO2 96% Comment: ra  BMI 21.67

## 2024-02-22 NOTE — PROGRESS NOTES
Chief Complaint   Patient presents with    Foot Pain     Worsened right foot/leg pain since yesterday afternoon. Patient states he was chopping fire wood yesterday and the logs rolled on his feet/legs.       Have you seen any other physician or provider since your last visit - [facility] for xray of left leg. Physical therapy at [facility].    Have you had any other diagnostic tests since your last visit? xray    Have you changed or stopped any medications since your last visit? no

## 2024-03-06 ENCOUNTER — TELEPHONE (OUTPATIENT)
Dept: FAMILY MEDICINE CLINIC | Age: 24
End: 2024-03-06

## 2024-03-06 NOTE — TELEPHONE ENCOUNTER
Esther called and stated Miguel A is experiencing cough and congestion. Tested for covid and it was negative. She was wondering if you could send him in something for it?

## 2024-03-08 RX ORDER — BENZONATATE 200 MG/1
200 CAPSULE ORAL 3 TIMES DAILY PRN
Qty: 30 CAPSULE | Refills: 0 | Status: SHIPPED | OUTPATIENT
Start: 2024-03-08 | End: 2024-03-15

## 2024-03-08 RX ORDER — AZITHROMYCIN 250 MG/1
TABLET, FILM COATED ORAL
Qty: 6 TABLET | Refills: 0 | Status: SHIPPED | OUTPATIENT
Start: 2024-03-08 | End: 2024-03-18

## 2024-03-19 ENCOUNTER — HOSPITAL ENCOUNTER (OUTPATIENT)
Dept: PHYSICAL THERAPY | Facility: HOSPITAL | Age: 24
Setting detail: THERAPIES SERIES
Discharge: HOME | End: 2024-03-19

## 2024-03-25 DIAGNOSIS — M54.6 CHRONIC MIDLINE THORACIC BACK PAIN: ICD-10-CM

## 2024-03-25 DIAGNOSIS — T45.1X5A CHEMOTHERAPY-INDUCED PERIPHERAL NEUROPATHY (HCC): ICD-10-CM

## 2024-03-25 DIAGNOSIS — G89.29 CHRONIC MIDLINE LOW BACK PAIN WITHOUT SCIATICA: ICD-10-CM

## 2024-03-25 DIAGNOSIS — G89.29 CHRONIC MIDLINE THORACIC BACK PAIN: ICD-10-CM

## 2024-03-25 DIAGNOSIS — M79.672 LEFT FOOT PAIN: ICD-10-CM

## 2024-03-25 DIAGNOSIS — M54.50 CHRONIC MIDLINE LOW BACK PAIN WITHOUT SCIATICA: ICD-10-CM

## 2024-03-25 DIAGNOSIS — G62.0 CHEMOTHERAPY-INDUCED PERIPHERAL NEUROPATHY (HCC): ICD-10-CM

## 2024-03-25 RX ORDER — HYDROCODONE BITARTRATE AND ACETAMINOPHEN 10; 325 MG/1; MG/1
1 TABLET ORAL EVERY 6 HOURS PRN
Qty: 120 TABLET | Refills: 0 | Status: SHIPPED | OUTPATIENT
Start: 2024-03-25 | End: 2024-04-24

## 2024-03-25 RX ORDER — GABAPENTIN 100 MG/1
CAPSULE ORAL
Qty: 90 CAPSULE | Refills: 2 | Status: SHIPPED | OUTPATIENT
Start: 2024-03-25 | End: 2024-06-29

## 2024-03-25 NOTE — TELEPHONE ENCOUNTER
Patient called, requested refill.       Next Office Visit Date:  Future Appointments   Date Time Provider Department Center   4/25/2024  2:00 PM Erum Valentine MD [facility] please review via PDMP

## 2024-03-26 DIAGNOSIS — F32.9 REACTIVE DEPRESSION: ICD-10-CM

## 2024-03-26 DIAGNOSIS — G89.29 CHRONIC MIDLINE THORACIC BACK PAIN: ICD-10-CM

## 2024-03-26 DIAGNOSIS — F41.9 ANXIETY: ICD-10-CM

## 2024-03-26 DIAGNOSIS — M25.511 BILATERAL SHOULDER PAIN, UNSPECIFIED CHRONICITY: ICD-10-CM

## 2024-03-26 DIAGNOSIS — G89.29 CHRONIC MIDLINE LOW BACK PAIN WITHOUT SCIATICA: Primary | ICD-10-CM

## 2024-03-26 DIAGNOSIS — M54.6 CHRONIC MIDLINE THORACIC BACK PAIN: ICD-10-CM

## 2024-03-26 DIAGNOSIS — M25.512 BILATERAL SHOULDER PAIN, UNSPECIFIED CHRONICITY: ICD-10-CM

## 2024-03-26 DIAGNOSIS — S13.4XXD WHIPLASH INJURY TO NECK, SUBSEQUENT ENCOUNTER: ICD-10-CM

## 2024-03-26 DIAGNOSIS — M54.50 CHRONIC MIDLINE LOW BACK PAIN WITHOUT SCIATICA: Primary | ICD-10-CM

## 2024-03-26 RX ORDER — ESCITALOPRAM OXALATE 20 MG/1
20 TABLET ORAL DAILY
Qty: 90 TABLET | Refills: 3 | OUTPATIENT
Start: 2024-03-26

## 2024-03-26 RX ORDER — ESCITALOPRAM OXALATE 20 MG/1
20 TABLET ORAL DAILY
Qty: 90 TABLET | Refills: 3 | Status: SHIPPED | OUTPATIENT
Start: 2024-03-26

## 2024-03-26 NOTE — TELEPHONE ENCOUNTER
Refill request received from patient      Next Office Visit Date:  Future Appointments   Date Time Provider Department Center   4/25/2024  2:00 PM Erum Valentine MD [facility] - Please review via PDMP        Last Office Visit:    2/22/2024

## 2024-03-31 DIAGNOSIS — R11.0 NAUSEA: ICD-10-CM

## 2024-04-01 RX ORDER — PROMETHAZINE HYDROCHLORIDE 25 MG/1
TABLET ORAL
Qty: 30 TABLET | Refills: 5 | Status: SHIPPED | OUTPATIENT
Start: 2024-04-01

## 2024-04-01 RX ORDER — CLONIDINE HYDROCHLORIDE 0.2 MG/1
TABLET ORAL
Qty: 90 TABLET | Refills: 3 | Status: SHIPPED | OUTPATIENT
Start: 2024-04-01

## 2024-04-01 NOTE — TELEPHONE ENCOUNTER
Refill request received from pharmacy      Next Office Visit Date:  Future Appointments   Date Time Provider Department Center   4/4/2024  3:00 PM Oliverio Vera PT [facility]   4/25/2024  2:00 PM Erum Valentine MD [facility] - Please review via PDMP        Last Office Visit:    2/22/2024

## 2024-04-24 NOTE — PATIENT INSTRUCTIONS
Savings tab, Search for Savings by Diagnosis then by Search by Diagnosis for all savings and discounts    [facility]:  What they offer: reducing medication cost, hearing aids assistance, reduce fee for dental services, enrollment in Medicaid & [employer], Medicare D Information, chronic disease self-management class, etc.  Phone Number:   [facility]; [address]; [phone]  [facility]; [address] [facility]; [phone]  [facility]; [address] [facility]; [phone]      UTILITY ASSISTANCE:  [facility]; [address] [facility]; [phone]  [facility]; [address] [place] [address]; [phone]   Community Action  [facility]; [address] [facility]; [phone]  [facility]: [facility] [place]; [phone]  [facility]; [address] [facility]; [phone] Food Resources*  (Call 211 if need more resources.)       SNAP (formerly known as Food [place]):  Benefit Hotline: 155.178.7850   Helpful Information: Option for your language, then Option 2 for a new application    [employer]  [employer]  [address] [facility]; [phone] (Emergency # only, call if food emergency other than  date)  What they offer: Serve [place], [place], [facility], & [facility]. Every Tuesday 11a-2p is pick-up day. No ID or application necessary.      FOOD ASSISTANCE [facility]:  [facility]  [address] [facility]; [phone] or [phone]  [employer]  [address] [facility]; [phone]  What they offer: Open Thursday & Friday, 10am-12 p.m. You can go once per month.                                                                             Must be a resident of [facility]. You will need your Photo ID/Drivers License and a current piece of mail with your name and address on it. You will also  need the Names and date of birth of everyone who lives in the

## 2024-04-25 ENCOUNTER — HOSPITAL ENCOUNTER (OUTPATIENT)
Facility: HOSPITAL | Age: 24
Discharge: HOME | End: 2024-04-25
Payer: MEDICAID

## 2024-04-25 ENCOUNTER — OFFICE VISIT (OUTPATIENT)
Dept: FAMILY MEDICINE CLINIC | Age: 24
End: 2024-04-25
Payer: MEDICAID

## 2024-04-25 VITALS
BODY MASS INDEX: 21.11 KG/M2 | WEIGHT: 150.8 LBS | OXYGEN SATURATION: 98 % | HEART RATE: 75 BPM | RESPIRATION RATE: 18 BRPM | TEMPERATURE: 98.8 F | HEIGHT: 71 IN | DIASTOLIC BLOOD PRESSURE: 68 MMHG | SYSTOLIC BLOOD PRESSURE: 112 MMHG

## 2024-04-25 DIAGNOSIS — T45.1X5A CHEMOTHERAPY-INDUCED PERIPHERAL NEUROPATHY (HCC): ICD-10-CM

## 2024-04-25 DIAGNOSIS — Z13.220 SCREENING, LIPID: ICD-10-CM

## 2024-04-25 DIAGNOSIS — G89.29 CHRONIC MIDLINE LOW BACK PAIN WITHOUT SCIATICA: ICD-10-CM

## 2024-04-25 DIAGNOSIS — M79.672 LEFT FOOT PAIN: ICD-10-CM

## 2024-04-25 DIAGNOSIS — E83.42 HYPOMAGNESEMIA: ICD-10-CM

## 2024-04-25 DIAGNOSIS — G89.29 CHRONIC MIDLINE THORACIC BACK PAIN: ICD-10-CM

## 2024-04-25 DIAGNOSIS — G62.0 CHEMOTHERAPY-INDUCED PERIPHERAL NEUROPATHY (HCC): ICD-10-CM

## 2024-04-25 DIAGNOSIS — M54.6 CHRONIC MIDLINE THORACIC BACK PAIN: ICD-10-CM

## 2024-04-25 DIAGNOSIS — M54.50 CHRONIC MIDLINE LOW BACK PAIN WITHOUT SCIATICA: ICD-10-CM

## 2024-04-25 DIAGNOSIS — R53.83 OTHER FATIGUE: ICD-10-CM

## 2024-04-25 DIAGNOSIS — M54.2 NECK PAIN: Primary | ICD-10-CM

## 2024-04-25 LAB
ALBUMIN SERPL-MCNC: 5 G/DL (ref 3.4–4.8)
ALBUMIN/GLOB SERPL: 2.5 {RATIO} (ref 0.8–2)
ALP SERPL-CCNC: 169 U/L (ref 25–100)
ALT SERPL-CCNC: 12 U/L (ref 4–36)
ANION GAP SERPL CALCULATED.3IONS-SCNC: 13 MMOL/L (ref 3–16)
AST SERPL-CCNC: 16 U/L (ref 8–33)
BILIRUB SERPL-MCNC: 0.7 MG/DL (ref 0.3–1.2)
BUN SERPL-MCNC: 11 MG/DL (ref 6–20)
CALCIUM SERPL-MCNC: 9.6 MG/DL (ref 8.5–10.5)
CHLORIDE SERPL-SCNC: 105 MMOL/L (ref 98–107)
CHOLEST SERPL-MCNC: 153 MG/DL (ref 0–200)
CO2 SERPL-SCNC: 25 MMOL/L (ref 20–30)
CREAT SERPL-MCNC: 0.7 MG/DL (ref 0.4–1.2)
ERYTHROCYTE [DISTWIDTH] IN BLOOD BY AUTOMATED COUNT: 11.6 % (ref 11–16)
GFR SERPLBLD CREATININE-BSD FMLA CKD-EPI: >90 ML/MIN/{1.73_M2}
GLOBULIN SER CALC-MCNC: 2 G/DL
GLUCOSE SERPL-MCNC: 87 MG/DL (ref 74–106)
HCT VFR BLD AUTO: 46.5 % (ref 40–54)
HDLC SERPL-MCNC: 39 MG/DL (ref 40–60)
HGB BLD-MCNC: 16.3 G/DL (ref 13–18)
LDLC SERPL CALC-MCNC: 98 MG/DL
MAGNESIUM SERPL-MCNC: 1.9 MG/DL (ref 1.7–2.4)
MCH RBC QN AUTO: 31.3 PG (ref 27–32)
MCHC RBC AUTO-ENTMCNC: 35.1 G/DL (ref 31–35)
MCV RBC AUTO: 89.4 FL (ref 80–100)
PLATELET # BLD AUTO: 218 K/UL (ref 150–400)
PMV BLD AUTO: 9.3 FL (ref 6–10)
POTASSIUM SERPL-SCNC: 3.7 MMOL/L (ref 3.4–5.1)
PROT SERPL-MCNC: 7 G/DL (ref 6.4–8.3)
RBC # BLD AUTO: 5.2 M/UL (ref 4.5–6)
SODIUM SERPL-SCNC: 143 MMOL/L (ref 136–145)
TRIGL SERPL-MCNC: 79 MG/DL (ref 0–249)
TSH SERPL DL<=0.005 MIU/L-ACNC: 0.79 UIU/ML (ref 0.27–4.2)
VLDLC SERPL CALC-MCNC: 16 MG/DL
WBC # BLD AUTO: 5.4 K/UL (ref 4–11)

## 2024-04-25 PROCEDURE — G8427 DOCREV CUR MEDS BY ELIG CLIN: HCPCS | Performed by: FAMILY MEDICINE

## 2024-04-25 PROCEDURE — 80053 COMPREHEN METABOLIC PANEL: CPT

## 2024-04-25 PROCEDURE — 1036F TOBACCO NON-USER: CPT | Performed by: FAMILY MEDICINE

## 2024-04-25 PROCEDURE — 85027 COMPLETE CBC AUTOMATED: CPT

## 2024-04-25 PROCEDURE — 36415 COLL VENOUS BLD VENIPUNCTURE: CPT

## 2024-04-25 PROCEDURE — 84443 ASSAY THYROID STIM HORMONE: CPT

## 2024-04-25 PROCEDURE — 83735 ASSAY OF MAGNESIUM: CPT

## 2024-04-25 PROCEDURE — 99214 OFFICE O/P EST MOD 30 MIN: CPT | Performed by: FAMILY MEDICINE

## 2024-04-25 PROCEDURE — G8420 CALC BMI NORM PARAMETERS: HCPCS | Performed by: FAMILY MEDICINE

## 2024-04-25 PROCEDURE — 80061 LIPID PANEL: CPT

## 2024-04-25 RX ORDER — HYDROCODONE BITARTRATE AND ACETAMINOPHEN 10; 325 MG/1; MG/1
1 TABLET ORAL EVERY 6 HOURS PRN
Qty: 120 TABLET | Refills: 0 | Status: SHIPPED | OUTPATIENT
Start: 2024-04-25 | End: 2024-05-25

## 2024-04-25 RX ORDER — HYDROCODONE BITARTRATE AND ACETAMINOPHEN 10; 325 MG/1; MG/1
1 TABLET ORAL EVERY 6 HOURS PRN
Qty: 120 TABLET | Refills: 0 | Status: SHIPPED | OUTPATIENT
Start: 2024-04-25 | End: 2024-04-25 | Stop reason: SDUPTHER

## 2024-04-25 SDOH — ECONOMIC STABILITY: INCOME INSECURITY: HOW HARD IS IT FOR YOU TO PAY FOR THE VERY BASICS LIKE FOOD, HOUSING, MEDICAL CARE, AND HEATING?: VERY HARD

## 2024-04-25 SDOH — ECONOMIC STABILITY: FOOD INSECURITY: WITHIN THE PAST 12 MONTHS, YOU WORRIED THAT YOUR FOOD WOULD RUN OUT BEFORE YOU GOT MONEY TO BUY MORE.: SOMETIMES TRUE

## 2024-04-25 SDOH — ECONOMIC STABILITY: FOOD INSECURITY: WITHIN THE PAST 12 MONTHS, THE FOOD YOU BOUGHT JUST DIDN'T LAST AND YOU DIDN'T HAVE MONEY TO GET MORE.: NEVER TRUE

## 2024-04-25 NOTE — PROGRESS NOTES
Chief Complaint   Patient presents with    Leg Pain     Reg F/u, increased pain    Back Pain     Neck pain increased lately.    ADHD     Reg F/U       Have you seen any other physician or provider since your last visit no    Have you had any other diagnostic tests since your last visit? no    Have you changed or stopped any medications since your last visit? no       
 MG per tablet      3. Chronic midline low back pain without sciatica  HYDROcodone-acetaminophen (NORCO)  MG per tablet    DISCONTINUED: HYDROcodone-acetaminophen (NORCO)  MG per tablet      4. Chronic midline thoracic back pain  HYDROcodone-acetaminophen (NORCO)  MG per tablet    DISCONTINUED: HYDROcodone-acetaminophen (NORCO)  MG per tablet      5. Left foot pain  HYDROcodone-acetaminophen (NORCO)  MG per tablet    DISCONTINUED: HYDROcodone-acetaminophen (NORCO)  MG per tablet      6. Hypomagnesemia  Magnesium      7. Other fatigue  Comprehensive Metabolic Panel    CBC    TSH      8. Screening, lipid  LIPID PANEL          Orders Placed This Encounter   Medications    DISCONTD: HYDROcodone-acetaminophen (NORCO)  MG per tablet     Sig: Take 1 tablet by mouth every 6 hours as needed for Pain for up to 30 days. Intended supply: 30 days Max Daily Amount: 4 tablets     Dispense:  120 tablet     Refill:  0     Reduce doses taken as pain becomes manageable    HYDROcodone-acetaminophen (NORCO)  MG per tablet     Sig: Take 1 tablet by mouth every 6 hours as needed for Pain for up to 30 days. Intended supply: 30 days  to be filled on or after June 5th Max Daily Amount: 4 tablets     Dispense:  120 tablet     Refill:  0     Reduce doses taken as pain becomes manageable        Medications Discontinued During This Encounter   Medication Reason    HYDROcodone-acetaminophen (NORCO)  MG per tablet REORDER    HYDROcodone-acetaminophen (NORCO)  MG per tablet REORDER       Controlled Substances Monitoring:      Please note: This chart was generated using Dragon dictation software. Although every effort was made to ensure the accuracy of this automated transcription, some errors in transcription may have occurred.

## 2024-04-30 DIAGNOSIS — E55.9 VITAMIN D DEFICIENCY: ICD-10-CM

## 2024-04-30 DIAGNOSIS — F41.9 ANXIETY: ICD-10-CM

## 2024-04-30 RX ORDER — HYDROXYZINE PAMOATE 25 MG/1
CAPSULE ORAL
Qty: 180 CAPSULE | Refills: 3 | Status: SHIPPED | OUTPATIENT
Start: 2024-04-30

## 2024-04-30 RX ORDER — OMEGA-3S/DHA/EPA/FISH OIL/D3 300MG-1000
400 CAPSULE ORAL DAILY
Qty: 30 TABLET | Refills: 5 | Status: SHIPPED | OUTPATIENT
Start: 2024-04-30

## 2024-04-30 RX ORDER — CHOLECALCIFEROL (VITAMIN D3) 125 MCG
2 CAPSULE ORAL NIGHTLY PRN
Qty: 180 TABLET | Refills: 3 | Status: SHIPPED | OUTPATIENT
Start: 2024-04-30

## 2024-04-30 NOTE — TELEPHONE ENCOUNTER
Refill request received from pharmacy      Next Office Visit Date:  Future Appointments   Date Time Provider Department Center   7/25/2024  2:00 PM Erum Valentine MD [facility] - Please review via PDMP        Last Office Visit:    4/25/2024

## 2024-05-08 ENCOUNTER — HOSPITAL ENCOUNTER (OUTPATIENT)
Dept: PHYSICAL THERAPY | Facility: HOSPITAL | Age: 24
Setting detail: THERAPIES SERIES
Discharge: HOME | End: 2024-05-08
Payer: OTHER MISCELLANEOUS

## 2024-05-08 PROCEDURE — 97110 THERAPEUTIC EXERCISES: CPT

## 2024-05-08 PROCEDURE — 97161 PT EVAL LOW COMPLEX 20 MIN: CPT

## 2024-05-08 ASSESSMENT — PAIN DESCRIPTION - PAIN TYPE: TYPE: CHRONIC PAIN;ACUTE PAIN

## 2024-05-08 ASSESSMENT — PAIN DESCRIPTION - ORIENTATION: ORIENTATION: POSTERIOR

## 2024-05-08 ASSESSMENT — PAIN DESCRIPTION - DESCRIPTORS: DESCRIPTORS: PRESSURE

## 2024-05-08 ASSESSMENT — PAIN SCALES - GENERAL: PAINLEVEL_OUTOF10: 3

## 2024-05-08 ASSESSMENT — PAIN DESCRIPTION - LOCATION: LOCATION: NECK
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
## 2024-05-14 ENCOUNTER — HOSPITAL ENCOUNTER (OUTPATIENT)
Dept: PHYSICAL THERAPY | Facility: HOSPITAL | Age: 24
Setting detail: THERAPIES SERIES
Discharge: HOME | End: 2024-05-14
Payer: OTHER MISCELLANEOUS

## 2024-05-14 PROCEDURE — 97110 THERAPEUTIC EXERCISES: CPT

## 2024-05-14 PROCEDURE — 97140 MANUAL THERAPY 1/> REGIONS: CPT

## 2024-05-14 NOTE — FLOWSHEET NOTE
Physical Therapy Daily Treatment Note   Date:  2024    TIme In: 1030                     Time Out: 1135    Patient Name:  Miguel A House    :  2000  MRN: [id number]    Restrictions/Precautions:    Pertinent Medical History:  Medical/Treatment Diagnosis Information:  Cervicalgia [M54.2]     Insurance/Certification information:  Payor: [employer] / Plan: [employer]URANCE / Product Type: *No Product type* /   Physician Information:  Erum Valentine MD  Plan of care signed (Y/N):    Visit# / total visits:     3 /    G-Code (if applicable):      Date / Visit # G-Code Applied:         Progress Note: []  Yes  [x]  No  Next due by: Visit #10       Pain level:  0 /10    Subjective: Pt states he is having a \"tension headache\"     Objective:  Observation:   Test measurements:    Palpation:    Exercises:  Exercise Resistance/Repetitions Date performed   UBE  x2'fwd x2' bwd. Lvl4.0 seat lvl 6 14   Doorway pec stretch  2x45\"  14   Standing rows  Green tubbed band 3x10 2\" 14   median nerve glides  2x10 R 14   towel SB mobs  2x10 2\" B 14   cervical rotation with towel  3x10 2\" B 14   Prayer stretch  2x30\"  Next tx        UTR  x15 B  14 GTB next tx   Bird dog  x10  14   Cat Cow  3x10  14   Prone W's  2x10 2\"  14 #2 Next tx   Prone Y's  3x10 2\"  14 #2 Next tx   Prone I's  3x10 2\"  14 #2 Next tx     Other Therapeutic Activities:      Manual Treatments:  Cervical traction, STM to B UT, scalenes, Levator scap x15'       Modalities:    x10'      Timed Code Treatment Minutes:  54      Total Treatment Minutes:  65    Treatment/Activity Tolerance:  [x] Patient tolerated treatment well [] Patient limited by fatigue  [] Patient limited by pain  [] Patient limited by other medical complications  [x] Other: Pt was able to progress through all exercises with min cues for proper form and to decrease muscle substitution. Pt responded well to manual tx, with decreased c/o of no tension headache enough in his neck.

## 2024-06-04 ENCOUNTER — HOSPITAL ENCOUNTER (OUTPATIENT)
Dept: PHYSICAL THERAPY | Facility: HOSPITAL | Age: 24
Setting detail: THERAPIES SERIES
Discharge: HOME | End: 2024-06-04

## 2024-06-04 NOTE — CARE COORDINATION
[facility]  Cancel/No Show Note    Service:  [x]Physical Therapy  []Occupational Therapy    Date: 6/4/24    # of Missed Tx:     Reason For Missed Treatment:  []Illness  []Provider unavailable  []No call/no show  []Late cancellation (<24 hours)  []Arrived >15 minutes late  []Other appointment  []Scheduling conflict  []Arrived on wrong day  []Physician discharged tx  [x]Transportation conflict  []Inclement weather  []Frequency of tx change  []No reason given  []Arrived but refused participation  []Unable to tolerate  []Medical complications  []Pt on hold due to-  []Other-     Plan:  [x]Rescheduled  []Continue at next scheduled apt  []Reminder phone call  []Contacted referring provider  []Discharged

## 2024-06-12 ENCOUNTER — HOSPITAL ENCOUNTER (OUTPATIENT)
Dept: PHYSICAL THERAPY | Facility: HOSPITAL | Age: 24
Setting detail: THERAPIES SERIES
Discharge: HOME | End: 2024-06-12
Payer: OTHER MISCELLANEOUS

## 2024-06-12 PROCEDURE — 97110 THERAPEUTIC EXERCISES: CPT

## 2024-06-12 PROCEDURE — 97140 MANUAL THERAPY 1/> REGIONS: CPT

## 2024-06-12 NOTE — FLOWSHEET NOTE
lai, Levator scap x15'       Modalities:    x10'      Timed Code Treatment Minutes:  58      Total Treatment Minutes:  73    Treatment/Activity Tolerance:  [x] Patient tolerated treatment well [] Patient limited by fatigue  [] Patient limited by pain  [] Patient limited by other medical complications  [x] Other: Pt was able to progress through all exercises with good tolerance to increased resistance. Pt responded well to manual tx, with decreased c/o of tension in his neck. Pt reassessed this reporting period. Despite missing multiple treatments, pt demonstrated substantial progress with goals most likely d/t HEP compliance. Pt currently 1/4 with STG and 2/4 with LTG. Advised pt to continue with HEP and perform every day. Pt in understanding.     Pain after treatment:  0/10    GOALS   Patient Goal(s):    Short Term Goals Completed by 4 weeks Goal Status   Pt to be I with HEP Partially met   Pt to tolerate cervical AROM in all directions without increase in pain Progressing   Pt to report a 20% improvement in symptoms MET   Pt to report a 50% decrease in headaches Progressing                                  Long Term Goals Completed by 6-8 weeks Goal Status   Neck index to 5% or less Not met    Pt to improve gross BUE strength to 4+/5 or greater MET   Pt to perform daily activities with cervical spine pain of 1/10 or less at greatest. Progressing    Pt to have 0/10 pain with palpation to inferior external occipital protuberance MET       Prognosis: [] Good [x] Fair  [] Poor    Patient Requires Follow-up: [x] Yes  [] No    Plan:   [x] Continue per plan of care [] Alter current plan (see comments)  [] Plan of care initiated [] Hold pending MD visit [] Discharge    Plan for Next Session:        Electronically signed by:  Oliverio Vera PT    Certification of Medical Necessity: It will be understood that this treatment plan is certified medically necessary by the documenting therapist and referring physician

## 2024-07-03 ENCOUNTER — HOSPITAL ENCOUNTER (OUTPATIENT)
Dept: PHYSICAL THERAPY | Facility: HOSPITAL | Age: 24
Setting detail: THERAPIES SERIES
Discharge: HOME | End: 2024-07-03

## 2024-07-03 DIAGNOSIS — M54.50 CHRONIC MIDLINE LOW BACK PAIN WITHOUT SCIATICA: ICD-10-CM

## 2024-07-03 DIAGNOSIS — M79.672 LEFT FOOT PAIN: ICD-10-CM

## 2024-07-03 DIAGNOSIS — G89.29 CHRONIC MIDLINE THORACIC BACK PAIN: ICD-10-CM

## 2024-07-03 DIAGNOSIS — G62.0 CHEMOTHERAPY-INDUCED PERIPHERAL NEUROPATHY (HCC): ICD-10-CM

## 2024-07-03 DIAGNOSIS — G89.29 CHRONIC MIDLINE LOW BACK PAIN WITHOUT SCIATICA: ICD-10-CM

## 2024-07-03 DIAGNOSIS — T45.1X5A CHEMOTHERAPY-INDUCED PERIPHERAL NEUROPATHY (HCC): ICD-10-CM

## 2024-07-03 DIAGNOSIS — M54.6 CHRONIC MIDLINE THORACIC BACK PAIN: ICD-10-CM

## 2024-07-03 NOTE — CARE COORDINATION
[facility]  Cancel/No Show Note    Service:  [x]Physical Therapy  []Occupational Therapy    Date: 7/3/24    # of Missed Tx:     Reason For Missed Treatment:  []Illness  []Provider unavailable  []No call/no show  []Late cancellation (<24 hours)  []Arrived >15 minutes late  []Other appointment  []Scheduling conflict  []Arrived on wrong day  []Physician discharged tx  []Transportation conflict  []Inclement weather  []Frequency of tx change  []No reason given  []Arrived but refused participation  []Unable to tolerate  []Medical complications  []Pt on hold due to-  [x]Other- Sick Child    Plan:  [x]Rescheduled  []Continue at next scheduled apt  []Reminder phone call  []Contacted referring provider  []Discharged

## 2024-07-03 NOTE — TELEPHONE ENCOUNTER
Patient called, requested refill.       Next Office Visit Date:  Future Appointments   Date Time Provider Department Center   7/3/2024  2:30 PM Oliverio Vera PT [facility]   7/25/2024  2:00 PM Erum Valentine MD [facility] please review via PDMP

## 2024-07-04 RX ORDER — HYDROCODONE BITARTRATE AND ACETAMINOPHEN 10; 325 MG/1; MG/1
1 TABLET ORAL EVERY 6 HOURS PRN
Qty: 120 TABLET | Refills: 0 | Status: SHIPPED | OUTPATIENT
Start: 2024-07-04 | End: 2024-08-03

## 2024-07-08 ENCOUNTER — HOSPITAL ENCOUNTER (OUTPATIENT)
Dept: PHYSICAL THERAPY | Facility: HOSPITAL | Age: 24
Setting detail: THERAPIES SERIES
Discharge: HOME | End: 2024-07-08

## 2024-07-08 NOTE — CARE COORDINATION
[facility]  Cancel/No Show Note    Service:  [x]Physical Therapy  []Occupational Therapy    Date: 7/8/24    # of Missed Tx:     Reason For Missed Treatment:  []Illness  []Provider unavailable  []No call/no show  []Late cancellation (<24 hours)  []Arrived >15 minutes late  []Other appointment  []Scheduling conflict  []Arrived on wrong day  []Physician discharged tx  []Transportation conflict  []Inclement weather  []Frequency of tx change  []No reason given  []Arrived but refused participation  []Unable to tolerate  []Medical complications  []Pt on hold due to-  [x]Other- In hospital    Plan:  []Rescheduled  []Continue at next scheduled apt  []Reminder phone call  []Contacted referring provider  []Discharged

## 2024-07-11 ENCOUNTER — OFFICE VISIT (OUTPATIENT)
Dept: FAMILY MEDICINE CLINIC | Age: 24
End: 2024-07-11
Payer: MEDICAID

## 2024-07-11 VITALS
HEIGHT: 71 IN | RESPIRATION RATE: 18 BRPM | OXYGEN SATURATION: 93 % | HEART RATE: 84 BPM | WEIGHT: 154.4 LBS | BODY MASS INDEX: 21.61 KG/M2 | TEMPERATURE: 98.4 F | SYSTOLIC BLOOD PRESSURE: 106 MMHG | DIASTOLIC BLOOD PRESSURE: 76 MMHG

## 2024-07-11 DIAGNOSIS — B34.9 VIRAL ILLNESS: ICD-10-CM

## 2024-07-11 DIAGNOSIS — J02.9 SORE THROAT: Primary | ICD-10-CM

## 2024-07-11 LAB
INFLUENZA A ANTIBODY: NEGATIVE
INFLUENZA B ANTIBODY: NEGATIVE
Lab: NORMAL
QC PASS/FAIL: NORMAL
S PYO AG THROAT QL: NORMAL
SARS-COV-2 RDRP RESP QL NAA+PROBE: NEGATIVE

## 2024-07-11 PROCEDURE — 87804 INFLUENZA ASSAY W/OPTIC: CPT

## 2024-07-11 PROCEDURE — 87880 STREP A ASSAY W/OPTIC: CPT

## 2024-07-11 PROCEDURE — 87635 SARS-COV-2 COVID-19 AMP PRB: CPT

## 2024-07-11 ASSESSMENT — ENCOUNTER SYMPTOMS
SORE THROAT: 1
RESPIRATORY NEGATIVE: 1
NAUSEA: 1
SINUS PRESSURE: 1
EYES NEGATIVE: 1
VOMITING: 1
DIARRHEA: 1
ABDOMINAL PAIN: 0

## 2024-07-11 NOTE — PROGRESS NOTES
[address]  [place] [address]  Dept: [phone]  Loc: [phone]     SUBJECTIVE:  Miguel A House is a 24 y.o. male that presents with   Chief Complaint   Patient presents with    Pharyngitis    Nausea & Vomiting    Diarrhea   .    HPI:    Miguel A is in office with a week long history of nausea, vomiting and diarrhea. He is accompanied by his grandparents. His grandmother says that he had a fever, but did not take his temperature with a thermometer, but he was warm to touch. Miguel A says that he only threw up once yesterday and once so far today. He denies any sick contacts. He denies any abdominal pain. He does note that he feels better today. It appears that he has had a viral illness. We discussed ways to manage symptoms and he understands it can take 7-10 day to resolve.     Pharyngitis  This is a new problem. The current episode started 1 to 4 weeks ago. The problem occurs daily. The problem has been gradually improving. Associated symptoms include fatigue, nausea, a sore throat and vomiting. Pertinent negatives include no abdominal pain. Nothing aggravates the symptoms. He has tried nothing for the symptoms.   Nausea & Vomiting  This is a new problem. The current episode started 1 to 4 weeks ago. The problem occurs daily. The problem has been gradually improving. Associated symptoms include fatigue, nausea, a sore throat and vomiting. Pertinent negatives include no abdominal pain. The symptoms are aggravated by drinking and eating. He has tried nothing for the symptoms.   Diarrhea   This is a new problem. The current episode started 1 to 4 weeks ago. The problem occurs 2 to 4 times per day. The problem has been gradually improving. The stool consistency is described as Watery. The patient states that diarrhea does not awaken him from sleep. Associated symptoms include vomiting. Pertinent negatives include no abdominal pain. Nothing aggravates

## 2024-07-25 ENCOUNTER — OFFICE VISIT (OUTPATIENT)
Dept: FAMILY MEDICINE CLINIC | Age: 24
End: 2024-07-25
Payer: MEDICAID

## 2024-07-25 VITALS
DIASTOLIC BLOOD PRESSURE: 74 MMHG | SYSTOLIC BLOOD PRESSURE: 106 MMHG | HEART RATE: 65 BPM | BODY MASS INDEX: 22.12 KG/M2 | RESPIRATION RATE: 16 BRPM | HEIGHT: 71 IN | OXYGEN SATURATION: 97 % | WEIGHT: 158 LBS | TEMPERATURE: 98.3 F

## 2024-07-25 DIAGNOSIS — G89.29 CHRONIC MIDLINE THORACIC BACK PAIN: ICD-10-CM

## 2024-07-25 DIAGNOSIS — E55.9 VITAMIN D DEFICIENCY: ICD-10-CM

## 2024-07-25 DIAGNOSIS — M54.50 CHRONIC MIDLINE LOW BACK PAIN WITHOUT SCIATICA: ICD-10-CM

## 2024-07-25 DIAGNOSIS — G89.29 CHRONIC MIDLINE LOW BACK PAIN WITHOUT SCIATICA: ICD-10-CM

## 2024-07-25 DIAGNOSIS — G62.0 CHEMOTHERAPY-INDUCED PERIPHERAL NEUROPATHY (HCC): ICD-10-CM

## 2024-07-25 DIAGNOSIS — T45.1X5A CHEMOTHERAPY-INDUCED PERIPHERAL NEUROPATHY (HCC): ICD-10-CM

## 2024-07-25 DIAGNOSIS — M79.672 LEFT FOOT PAIN: ICD-10-CM

## 2024-07-25 DIAGNOSIS — M54.6 CHRONIC MIDLINE THORACIC BACK PAIN: ICD-10-CM

## 2024-07-25 PROCEDURE — 1036F TOBACCO NON-USER: CPT | Performed by: FAMILY MEDICINE

## 2024-07-25 PROCEDURE — G8420 CALC BMI NORM PARAMETERS: HCPCS | Performed by: FAMILY MEDICINE

## 2024-07-25 PROCEDURE — G8427 DOCREV CUR MEDS BY ELIG CLIN: HCPCS | Performed by: FAMILY MEDICINE

## 2024-07-25 PROCEDURE — 99214 OFFICE O/P EST MOD 30 MIN: CPT | Performed by: FAMILY MEDICINE

## 2024-07-25 RX ORDER — GABAPENTIN 100 MG/1
CAPSULE ORAL
Qty: 90 CAPSULE | Refills: 2 | Status: SHIPPED | OUTPATIENT
Start: 2024-07-25 | End: 2024-11-24

## 2024-07-25 RX ORDER — HYDROCODONE BITARTRATE AND ACETAMINOPHEN 10; 325 MG/1; MG/1
1 TABLET ORAL EVERY 6 HOURS PRN
Qty: 120 TABLET | Refills: 0 | Status: SHIPPED | OUTPATIENT
Start: 2024-07-25 | End: 2024-08-24

## 2024-07-25 RX ORDER — HYDROCODONE BITARTRATE AND ACETAMINOPHEN 10; 325 MG/1; MG/1
1 TABLET ORAL EVERY 6 HOURS PRN
Qty: 120 TABLET | Refills: 0 | Status: SHIPPED | OUTPATIENT
Start: 2024-07-25 | End: 2024-07-25 | Stop reason: SDUPTHER

## 2024-07-25 RX ORDER — OMEGA-3S/DHA/EPA/FISH OIL/D3 300MG-1000
400 CAPSULE ORAL DAILY
Qty: 30 TABLET | Refills: 5 | Status: SHIPPED | OUTPATIENT
Start: 2024-07-25

## 2024-07-25 NOTE — PROGRESS NOTES
Chief Complaint   Patient presents with    ADHD     Reg F/U       Have you seen any other physician or provider since your last visit no    Have you had any other diagnostic tests since your last visit? no    Have you changed or stopped any medications since your last visit? no

## 2024-07-25 NOTE — PROGRESS NOTES
SUBJECTIVE:    Patient ID: Miguel A House is a 24 y.o. male.    Chief Complaint   Patient presents with    ADHD     Reg F/U    Insomnia     Per patient he is not getting rested after sleeping       HPI: office visit  He is having some panic attacks.  Bit of anxiety.  He does explain them like a true panic attack.  He does explain to have some blood sounds like chest pain with them.  He is frustrated with his current situation.  He says he is having difficulty sleeping even he is waking up at night sometimes with this panic.  He says he not really sure what started that.  He is not sleeping quite as well as he usually does.  He has not had any significant changes in his medical issues.  He still has follow-up with .  He still feels like he is struggles to focus.  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  He does feel like his medication helps.  He has not had any recent falls or injuries.  Review of Systems   Constitutional:  Positive for fatigue.   Eyes: Negative.    Respiratory: Negative.     Cardiovascular: Negative.    Gastrointestinal: Negative.    Endocrine: Negative.    Genitourinary: Negative.    Musculoskeletal: Negative.    Neurological: Negative.    Psychiatric/Behavioral: Negative.     All other systems reviewed and are negative.       OBJECTIVE:  /74   Pulse 65   Temp 98.3 °F (36.8 °C) (Infrared)   Resp 16   Ht 1.803 m (5' 11\")   Wt 71.7 kg (158 lb)   SpO2 97% Comment: ra  BMI 22.04 kg/m²    Wt Readings from Last 3 Encounters:   07/25/24 71.7 kg (158 lb)   07/11/24 70 kg (154 lb 6.4 oz)   04/25/24 68.4 kg (150 lb 12.8 oz)     BP Readings from Last 3 Encounters:   07/25/24 106/74   07/11/24 106/76   04/25/24 112/68      Pulse Readings from Last 3 Encounters:   07/25/24 65   07/11/24 84   04/25/24 75     Body mass index is 22.04 kg/m².   Resp Readings from Last 3 Encounters:   07/25/24 16   07/11/24 18   04/25/24 18     Past medical,

## 2024-08-05 ENCOUNTER — TELEPHONE (OUTPATIENT)
Dept: FAMILY MEDICINE CLINIC | Age: 24
End: 2024-08-05

## 2024-09-10 ENCOUNTER — TELEPHONE (OUTPATIENT)
Dept: FAMILY MEDICINE CLINIC | Age: 24
End: 2024-09-10

## 2024-09-10 NOTE — TELEPHONE ENCOUNTER
Esther called and stated that Susan woke up with a sore throat this morning and isn't feeling good. She stated her grandkids have strep and she was wondering if you could send susan in something?

## 2024-09-12 RX ORDER — CEFDINIR 300 MG/1
300 CAPSULE ORAL 2 TIMES DAILY
Qty: 20 CAPSULE | Refills: 0 | Status: SHIPPED | OUTPATIENT
Start: 2024-09-12 | End: 2024-09-22

## 2024-09-23 DIAGNOSIS — R11.0 NAUSEA: ICD-10-CM

## 2024-09-24 RX ORDER — PROMETHAZINE HYDROCHLORIDE 25 MG/1
TABLET ORAL
Qty: 30 TABLET | Refills: 5 | Status: SHIPPED | OUTPATIENT
Start: 2024-09-24

## 2024-10-08 DIAGNOSIS — M79.672 LEFT FOOT PAIN: ICD-10-CM

## 2024-10-08 DIAGNOSIS — M54.50 CHRONIC MIDLINE LOW BACK PAIN WITHOUT SCIATICA: ICD-10-CM

## 2024-10-08 DIAGNOSIS — G62.0 CHEMOTHERAPY-INDUCED PERIPHERAL NEUROPATHY (HCC): ICD-10-CM

## 2024-10-08 DIAGNOSIS — G89.29 CHRONIC MIDLINE THORACIC BACK PAIN: ICD-10-CM

## 2024-10-08 DIAGNOSIS — G89.29 CHRONIC MIDLINE LOW BACK PAIN WITHOUT SCIATICA: ICD-10-CM

## 2024-10-08 DIAGNOSIS — T45.1X5A CHEMOTHERAPY-INDUCED PERIPHERAL NEUROPATHY (HCC): ICD-10-CM

## 2024-10-08 DIAGNOSIS — M54.6 CHRONIC MIDLINE THORACIC BACK PAIN: ICD-10-CM

## 2024-10-08 RX ORDER — HYDROCODONE BITARTRATE AND ACETAMINOPHEN 10; 325 MG/1; MG/1
1 TABLET ORAL EVERY 6 HOURS PRN
Qty: 120 TABLET | Refills: 0 | Status: CANCELLED | OUTPATIENT
Start: 2024-10-08 | End: 2024-11-07

## 2024-10-08 NOTE — TELEPHONE ENCOUNTER
Patient called, requested refill.       Next Office Visit Date:  Future Appointments   Date Time Provider Department Center   10/29/2024  3:00 PM Erum Valentine MD [facility] please review via PDMP

## 2024-10-09 DIAGNOSIS — G89.29 CHRONIC MIDLINE THORACIC BACK PAIN: ICD-10-CM

## 2024-10-09 DIAGNOSIS — G89.29 CHRONIC MIDLINE LOW BACK PAIN WITHOUT SCIATICA: ICD-10-CM

## 2024-10-09 DIAGNOSIS — M54.50 CHRONIC MIDLINE LOW BACK PAIN WITHOUT SCIATICA: ICD-10-CM

## 2024-10-09 DIAGNOSIS — G62.0 CHEMOTHERAPY-INDUCED PERIPHERAL NEUROPATHY (HCC): ICD-10-CM

## 2024-10-09 DIAGNOSIS — M79.672 LEFT FOOT PAIN: ICD-10-CM

## 2024-10-09 DIAGNOSIS — T45.1X5A CHEMOTHERAPY-INDUCED PERIPHERAL NEUROPATHY (HCC): ICD-10-CM

## 2024-10-09 DIAGNOSIS — M54.6 CHRONIC MIDLINE THORACIC BACK PAIN: ICD-10-CM

## 2024-10-09 RX ORDER — HYDROCODONE BITARTRATE AND ACETAMINOPHEN 10; 325 MG/1; MG/1
1 TABLET ORAL EVERY 6 HOURS PRN
Qty: 120 TABLET | Refills: 0 | Status: SHIPPED | OUTPATIENT
Start: 2024-10-09 | End: 2024-11-08

## 2024-10-09 NOTE — TELEPHONE ENCOUNTER
Refill request received from pharmacy      Next Office Visit Date:  Future Appointments   Date Time Provider Department Center   10/29/2024  3:00 PM Erum Valentine MD [facility] - Please review via PDMP        Last Office Visit:    7/25/2024

## 2024-10-29 ENCOUNTER — OFFICE VISIT (OUTPATIENT)
Dept: FAMILY MEDICINE CLINIC | Age: 24
End: 2024-10-29

## 2024-10-29 VITALS
DIASTOLIC BLOOD PRESSURE: 82 MMHG | HEIGHT: 71 IN | RESPIRATION RATE: 16 BRPM | TEMPERATURE: 98.1 F | OXYGEN SATURATION: 95 % | SYSTOLIC BLOOD PRESSURE: 120 MMHG | BODY MASS INDEX: 22.37 KG/M2 | HEART RATE: 94 BPM | WEIGHT: 159.8 LBS

## 2024-10-29 DIAGNOSIS — G89.29 CHRONIC MIDLINE THORACIC BACK PAIN: ICD-10-CM

## 2024-10-29 DIAGNOSIS — M54.50 CHRONIC MIDLINE LOW BACK PAIN WITHOUT SCIATICA: ICD-10-CM

## 2024-10-29 DIAGNOSIS — M79.672 LEFT FOOT PAIN: ICD-10-CM

## 2024-10-29 DIAGNOSIS — F41.9 ANXIETY: Primary | ICD-10-CM

## 2024-10-29 DIAGNOSIS — G89.29 CHRONIC MIDLINE LOW BACK PAIN WITHOUT SCIATICA: ICD-10-CM

## 2024-10-29 DIAGNOSIS — F32.9 REACTIVE DEPRESSION: ICD-10-CM

## 2024-10-29 DIAGNOSIS — M54.6 CHRONIC MIDLINE THORACIC BACK PAIN: ICD-10-CM

## 2024-10-29 DIAGNOSIS — T45.1X5A CHEMOTHERAPY-INDUCED PERIPHERAL NEUROPATHY (HCC): ICD-10-CM

## 2024-10-29 DIAGNOSIS — G62.0 CHEMOTHERAPY-INDUCED PERIPHERAL NEUROPATHY (HCC): ICD-10-CM

## 2024-10-29 DIAGNOSIS — Z23 NEED FOR INFLUENZA VACCINATION: ICD-10-CM

## 2024-10-29 RX ORDER — HYDROCODONE BITARTRATE AND ACETAMINOPHEN 10; 325 MG/1; MG/1
1 TABLET ORAL EVERY 6 HOURS PRN
Qty: 120 TABLET | Refills: 0 | Status: SHIPPED | OUTPATIENT
Start: 2024-10-29 | End: 2024-10-29 | Stop reason: SDUPTHER

## 2024-10-29 RX ORDER — GABAPENTIN 100 MG/1
CAPSULE ORAL
Qty: 90 CAPSULE | Refills: 2 | Status: CANCELLED | OUTPATIENT
Start: 2024-10-29 | End: 2025-02-28

## 2024-10-29 RX ORDER — HYDROCODONE BITARTRATE AND ACETAMINOPHEN 10; 325 MG/1; MG/1
1 TABLET ORAL EVERY 6 HOURS PRN
Qty: 120 TABLET | Refills: 0 | Status: SHIPPED | OUTPATIENT
Start: 2024-10-29 | End: 2024-11-28

## 2024-10-29 NOTE — PROGRESS NOTES
\"Have you been to the ER, urgent care clinic since your last visit?  Hospitalized since your last visit?\"    NO    “Have you seen or consulted any other health care providers outside our system since your last visit?”    NO  Immunizations Administered       Name Date Dose Route    Influenza, AFLURIA, FLUZONE, (age4 y+), IM, Trivalent MDV, 0.5mL 10/29/2024 0.5 mL Intramuscular    Site: Deltoid- Left    Lot: [id number]    NDC: 73688-992-52         Patient tolerated injection well. Patient advised to wait 20 minutes in the office following the injection. No signs/symptoms of reaction noted after 20 minutes.

## 2024-10-29 NOTE — PROGRESS NOTES
SUBJECTIVE:    Patient ID: Miguel A House is a 24 y.o. male.    Chief Complaint   Patient presents with    ADHD     Reg f/U       HPI: office visit  In the office today in follow-up of his attention deficit.  He does feel like he is still having some anxiety though he does feel like the medications help some.  He is having some panic attacks per his grandmother.  He says they are not that bad.  He is working on trying to help control them.  He is still having a lot of issues with his feet and his neuropathy due to his chemotherapy.  He does seem to be stable.  He has oncology follow-up.  He is not having any significant medication problems.  He denies any recent falls or injuries.  He is not having any other new issues.  Review of Systems   Constitutional:  Positive for fatigue.   Eyes: Negative.    Respiratory: Negative.     Cardiovascular: Negative.    Gastrointestinal: Negative.    Endocrine: Negative.    Genitourinary: Negative.    Musculoskeletal: Negative.    Neurological: Negative.    Psychiatric/Behavioral: Negative.     All other systems reviewed and are negative.       OBJECTIVE:  /82   Pulse 94   Temp 98.1 °F (36.7 °C) (Infrared)   Resp 16   Ht 1.803 m (5' 11\")   Wt 72.5 kg (159 lb 12.8 oz)   SpO2 95% Comment: ra  BMI 22.29 kg/m²    Wt Readings from Last 3 Encounters:   10/29/24 72.5 kg (159 lb 12.8 oz)   07/25/24 71.7 kg (158 lb)   07/11/24 70 kg (154 lb 6.4 oz)     BP Readings from Last 3 Encounters:   10/29/24 120/82   07/25/24 106/74   07/11/24 106/76      Pulse Readings from Last 3 Encounters:   10/29/24 94   07/25/24 65   07/11/24 84     Body mass index is 22.29 kg/m².   Resp Readings from Last 3 Encounters:   10/29/24 16   07/25/24 16   07/11/24 18     Past medical, surgical, family and social history were reviewed and updated with the patient.     Physical Exam  Vitals and nursing note reviewed.   Constitutional:       Appearance: Normal appearance. He is underweight. He is not

## 2024-11-09 ASSESSMENT — ENCOUNTER SYMPTOMS
RESPIRATORY NEGATIVE: 1
EYES NEGATIVE: 1
GASTROINTESTINAL NEGATIVE: 1

## 2024-11-22 DIAGNOSIS — E55.9 VITAMIN D DEFICIENCY: ICD-10-CM

## 2024-11-22 RX ORDER — OMEGA-3S/DHA/EPA/FISH OIL/D3 300MG-1000
400 CAPSULE ORAL DAILY
Qty: 30 TABLET | Refills: 5 | Status: SHIPPED | OUTPATIENT
Start: 2024-11-22

## 2024-11-22 NOTE — TELEPHONE ENCOUNTER
Refill request received from pharmacy      Next Office Visit Date:  Future Appointments   Date Time Provider Department Center   1/28/2025  1:30 PM Erum Valentine MD [facility] - Please review via PDMP        Last Office Visit:    10/29/2024

## 2024-12-30 DIAGNOSIS — M54.6 CHRONIC MIDLINE THORACIC BACK PAIN: ICD-10-CM

## 2024-12-30 DIAGNOSIS — G62.0 CHEMOTHERAPY-INDUCED PERIPHERAL NEUROPATHY (HCC): ICD-10-CM

## 2024-12-30 DIAGNOSIS — K21.9 GASTROESOPHAGEAL REFLUX DISEASE WITHOUT ESOPHAGITIS: ICD-10-CM

## 2024-12-30 DIAGNOSIS — T45.1X5A CHEMOTHERAPY-INDUCED PERIPHERAL NEUROPATHY (HCC): ICD-10-CM

## 2024-12-30 DIAGNOSIS — M54.50 CHRONIC MIDLINE LOW BACK PAIN WITHOUT SCIATICA: ICD-10-CM

## 2024-12-30 DIAGNOSIS — M79.672 LEFT FOOT PAIN: ICD-10-CM

## 2024-12-30 DIAGNOSIS — G89.29 CHRONIC MIDLINE LOW BACK PAIN WITHOUT SCIATICA: ICD-10-CM

## 2024-12-30 DIAGNOSIS — G89.29 CHRONIC MIDLINE THORACIC BACK PAIN: ICD-10-CM

## 2024-12-30 RX ORDER — HYDROCODONE BITARTRATE AND ACETAMINOPHEN 10; 325 MG/1; MG/1
1 TABLET ORAL EVERY 6 HOURS PRN
Qty: 120 TABLET | Refills: 0 | Status: SHIPPED | OUTPATIENT
Start: 2024-12-30 | End: 2025-01-29

## 2024-12-30 RX ORDER — PANTOPRAZOLE SODIUM 20 MG/1
TABLET, DELAYED RELEASE ORAL
Qty: 30 TABLET | Refills: 5 | Status: SHIPPED | OUTPATIENT
Start: 2024-12-30

## 2025-01-27 DIAGNOSIS — M54.6 CHRONIC MIDLINE THORACIC BACK PAIN: ICD-10-CM

## 2025-01-27 DIAGNOSIS — G62.0 CHEMOTHERAPY-INDUCED PERIPHERAL NEUROPATHY (HCC): ICD-10-CM

## 2025-01-27 DIAGNOSIS — G89.29 CHRONIC MIDLINE LOW BACK PAIN WITHOUT SCIATICA: ICD-10-CM

## 2025-01-27 DIAGNOSIS — G89.29 CHRONIC MIDLINE THORACIC BACK PAIN: ICD-10-CM

## 2025-01-27 DIAGNOSIS — M54.50 CHRONIC MIDLINE LOW BACK PAIN WITHOUT SCIATICA: ICD-10-CM

## 2025-01-27 DIAGNOSIS — M79.672 LEFT FOOT PAIN: ICD-10-CM

## 2025-01-27 DIAGNOSIS — T45.1X5A CHEMOTHERAPY-INDUCED PERIPHERAL NEUROPATHY (HCC): ICD-10-CM

## 2025-01-27 NOTE — TELEPHONE ENCOUNTER
Patient called, requested refill.       Next Office Visit Date:  Future Appointments   Date Time Provider Department Center   1/28/2025  1:30 PM Erum Valentine [facility] please review via PDMP

## 2025-01-28 ENCOUNTER — OFFICE VISIT (OUTPATIENT)
Age: 25
End: 2025-01-28
Payer: MEDICAID

## 2025-01-28 VITALS
OXYGEN SATURATION: 98 % | BODY MASS INDEX: 22.76 KG/M2 | HEART RATE: 78 BPM | WEIGHT: 162.6 LBS | RESPIRATION RATE: 16 BRPM | TEMPERATURE: 97.7 F | HEIGHT: 71 IN | SYSTOLIC BLOOD PRESSURE: 110 MMHG | DIASTOLIC BLOOD PRESSURE: 70 MMHG

## 2025-01-28 DIAGNOSIS — G89.29 CHRONIC MIDLINE THORACIC BACK PAIN: ICD-10-CM

## 2025-01-28 DIAGNOSIS — M54.6 CHRONIC MIDLINE THORACIC BACK PAIN: ICD-10-CM

## 2025-01-28 DIAGNOSIS — T45.1X5A CHEMOTHERAPY-INDUCED PERIPHERAL NEUROPATHY: Primary | ICD-10-CM

## 2025-01-28 DIAGNOSIS — G89.29 CHRONIC MIDLINE LOW BACK PAIN WITHOUT SCIATICA: ICD-10-CM

## 2025-01-28 DIAGNOSIS — M54.50 CHRONIC MIDLINE LOW BACK PAIN WITHOUT SCIATICA: ICD-10-CM

## 2025-01-28 DIAGNOSIS — F90.0 ATTENTION DEFICIT HYPERACTIVITY DISORDER (ADHD), PREDOMINANTLY INATTENTIVE TYPE: ICD-10-CM

## 2025-01-28 DIAGNOSIS — G62.0 CHEMOTHERAPY-INDUCED PERIPHERAL NEUROPATHY: Primary | ICD-10-CM

## 2025-01-28 DIAGNOSIS — G47.00 INSOMNIA, UNSPECIFIED TYPE: ICD-10-CM

## 2025-01-28 DIAGNOSIS — F41.9 ANXIETY: ICD-10-CM

## 2025-01-28 DIAGNOSIS — M62.81 MUSCULAR WEAKNESS: ICD-10-CM

## 2025-01-28 PROCEDURE — 99214 OFFICE O/P EST MOD 30 MIN: CPT | Performed by: FAMILY MEDICINE

## 2025-01-28 PROCEDURE — G8420 CALC BMI NORM PARAMETERS: HCPCS | Performed by: FAMILY MEDICINE

## 2025-01-28 PROCEDURE — G8427 DOCREV CUR MEDS BY ELIG CLIN: HCPCS | Performed by: FAMILY MEDICINE

## 2025-01-28 PROCEDURE — 1036F TOBACCO NON-USER: CPT | Performed by: FAMILY MEDICINE

## 2025-01-28 RX ORDER — ALPRAZOLAM 1 MG/1
1 TABLET ORAL 4 TIMES DAILY
COMMUNITY
Start: 2025-01-08

## 2025-01-28 RX ORDER — HYDROCODONE BITARTRATE AND ACETAMINOPHEN 10; 325 MG/1; MG/1
1 TABLET ORAL EVERY 6 HOURS PRN
Qty: 120 TABLET | Refills: 0 | Status: SHIPPED | OUTPATIENT
Start: 2025-01-28 | End: 2025-02-27

## 2025-01-28 RX ORDER — PANTOPRAZOLE SODIUM 40 MG/1
40 TABLET, DELAYED RELEASE ORAL
Qty: 90 TABLET | Refills: 2 | Status: SHIPPED | OUTPATIENT
Start: 2025-01-28

## 2025-01-28 SDOH — ECONOMIC STABILITY: FOOD INSECURITY: WITHIN THE PAST 12 MONTHS, YOU WORRIED THAT YOUR FOOD WOULD RUN OUT BEFORE YOU GOT MONEY TO BUY MORE.: NEVER TRUE

## 2025-01-28 SDOH — ECONOMIC STABILITY: FOOD INSECURITY: WITHIN THE PAST 12 MONTHS, THE FOOD YOU BOUGHT JUST DIDN'T LAST AND YOU DIDN'T HAVE MONEY TO GET MORE.: NEVER TRUE

## 2025-01-28 ASSESSMENT — PATIENT HEALTH QUESTIONNAIRE - PHQ9
7. TROUBLE CONCENTRATING ON THINGS, SUCH AS READING THE NEWSPAPER OR WATCHING TELEVISION: NOT AT ALL
SUM OF ALL RESPONSES TO PHQ QUESTIONS 1-9: 0
9. THOUGHTS THAT YOU WOULD BE BETTER OFF DEAD, OR OF HURTING YOURSELF: NOT AT ALL
3. TROUBLE FALLING OR STAYING ASLEEP: NOT AT ALL
4. FEELING TIRED OR HAVING LITTLE ENERGY: NOT AT ALL
1. LITTLE INTEREST OR PLEASURE IN DOING THINGS: NOT AT ALL
8. MOVING OR SPEAKING SO SLOWLY THAT OTHER PEOPLE COULD HAVE NOTICED. OR THE OPPOSITE, BEING SO FIGETY OR RESTLESS THAT YOU HAVE BEEN MOVING AROUND A LOT MORE THAN USUAL: NOT AT ALL
SUM OF ALL RESPONSES TO PHQ9 QUESTIONS 1 & 2: 0
5. POOR APPETITE OR OVEREATING: NOT AT ALL
2. FEELING DOWN, DEPRESSED OR HOPELESS: NOT AT ALL
SUM OF ALL RESPONSES TO PHQ QUESTIONS 1-9: 0
SUM OF ALL RESPONSES TO PHQ QUESTIONS 1-9: 0
10. IF YOU CHECKED OFF ANY PROBLEMS, HOW DIFFICULT HAVE THESE PROBLEMS MADE IT FOR YOU TO DO YOUR WORK, TAKE CARE OF THINGS AT HOME, OR GET ALONG WITH OTHER PEOPLE: NOT DIFFICULT AT ALL
6. FEELING BAD ABOUT YOURSELF - OR THAT YOU ARE A FAILURE OR HAVE LET YOURSELF OR YOUR FAMILY DOWN: NOT AT ALL
SUM OF ALL RESPONSES TO PHQ QUESTIONS 1-9: 0

## 2025-01-28 NOTE — PROGRESS NOTES
SUBJECTIVE:    Patient ID: Miguel A House is a 24 y.o. male.    Chief Complaint   Patient presents with    Fall     Patient fell twice on ice injured his right hand and left hip.     ADHD     Reg F/U       HPI: office visit  Office today in follow-up of his attention deficit.  He does feel like he is really struggling at times with his fatigue.  He is still complaining of some postchemotherapy problems particularly with his legs and feet.  He is still having some issues with his mood.  He does feel like he does pretty well with the current medicines.  He has had a fall on the ice a couple times during this last bad spell we had with the winter weather.  He says he did not hurt anything bad though he does say he is little bit sore.  He is not had any significant medication problems.  His blood pressure seem to be doing okay.  He is sleeping pretty well most of the time.  He denies any chest pain shortness of breath or new issues.    Review of Systems   Constitutional:  Positive for fatigue.   Eyes: Negative.    Respiratory: Negative.     Cardiovascular: Negative.    Gastrointestinal: Negative.    Endocrine: Negative.    Genitourinary: Negative.    Musculoskeletal: Negative.    Neurological: Negative.    Psychiatric/Behavioral: Negative.     All other systems reviewed and are negative.       OBJECTIVE:  /70   Pulse 78   Temp 97.7 °F (36.5 °C) (Infrared)   Resp 16   Ht 1.803 m (5' 11\")   Wt 73.8 kg (162 lb 9.6 oz)   SpO2 98% Comment: ra  BMI 22.68 kg/m²    Wt Readings from Last 3 Encounters:   01/28/25 73.8 kg (162 lb 9.6 oz)   10/29/24 72.5 kg (159 lb 12.8 oz)   07/25/24 71.7 kg (158 lb)     BP Readings from Last 3 Encounters:   01/28/25 110/70   10/29/24 120/82   07/25/24 106/74      Pulse Readings from Last 3 Encounters:   01/28/25 78   10/29/24 94   07/25/24 65     Body mass index is 22.68 kg/m².   Resp Readings from Last 3 Encounters:   01/28/25 16   10/29/24 16   07/25/24 16     Past medical,

## 2025-02-25 DIAGNOSIS — M79.672 LEFT FOOT PAIN: ICD-10-CM

## 2025-02-25 DIAGNOSIS — T45.1X5A CHEMOTHERAPY-INDUCED PERIPHERAL NEUROPATHY: ICD-10-CM

## 2025-02-25 DIAGNOSIS — G62.0 CHEMOTHERAPY-INDUCED PERIPHERAL NEUROPATHY: ICD-10-CM

## 2025-02-25 DIAGNOSIS — G89.29 CHRONIC MIDLINE LOW BACK PAIN WITHOUT SCIATICA: ICD-10-CM

## 2025-02-25 DIAGNOSIS — G89.29 CHRONIC MIDLINE THORACIC BACK PAIN: ICD-10-CM

## 2025-02-25 DIAGNOSIS — M54.50 CHRONIC MIDLINE LOW BACK PAIN WITHOUT SCIATICA: ICD-10-CM

## 2025-02-25 DIAGNOSIS — M54.6 CHRONIC MIDLINE THORACIC BACK PAIN: ICD-10-CM

## 2025-02-25 RX ORDER — HYDROCODONE BITARTRATE AND ACETAMINOPHEN 10; 325 MG/1; MG/1
1 TABLET ORAL EVERY 6 HOURS PRN
Qty: 120 TABLET | Refills: 0 | Status: SHIPPED | OUTPATIENT
Start: 2025-02-25 | End: 2025-03-27

## 2025-02-25 NOTE — TELEPHONE ENCOUNTER
Refill request received from pharmacy      Next Office Visit Date:  Future Appointments   Date Time Provider Department Center   4/28/2025  3:00 PM Erum Valentine [facility] - Please review via PDMP        Last Office Visit:    1/28/2025

## 2025-03-21 DIAGNOSIS — R11.0 NAUSEA: ICD-10-CM

## 2025-03-21 RX ORDER — PROMETHAZINE HYDROCHLORIDE 25 MG/1
TABLET ORAL
Qty: 30 TABLET | Refills: 5 | Status: SHIPPED | OUTPATIENT
Start: 2025-03-21

## 2025-03-21 NOTE — TELEPHONE ENCOUNTER
Refill request received from pharmacy      Next Office Visit Date:  Future Appointments   Date Time Provider Department Center   4/28/2025  3:00 PM Erum Valentine MD [facility] - Please review via PDMP        Last Office Visit:    1/28/2025

## 2025-03-25 DIAGNOSIS — M79.672 LEFT FOOT PAIN: ICD-10-CM

## 2025-03-25 DIAGNOSIS — M54.6 CHRONIC MIDLINE THORACIC BACK PAIN: ICD-10-CM

## 2025-03-25 DIAGNOSIS — G89.29 CHRONIC MIDLINE THORACIC BACK PAIN: ICD-10-CM

## 2025-03-25 DIAGNOSIS — T45.1X5A CHEMOTHERAPY-INDUCED PERIPHERAL NEUROPATHY: ICD-10-CM

## 2025-03-25 DIAGNOSIS — M54.50 CHRONIC MIDLINE LOW BACK PAIN WITHOUT SCIATICA: ICD-10-CM

## 2025-03-25 DIAGNOSIS — G62.0 CHEMOTHERAPY-INDUCED PERIPHERAL NEUROPATHY: ICD-10-CM

## 2025-03-25 DIAGNOSIS — G89.29 CHRONIC MIDLINE LOW BACK PAIN WITHOUT SCIATICA: ICD-10-CM

## 2025-03-25 RX ORDER — CLONIDINE HYDROCHLORIDE 0.2 MG/1
0.2 TABLET ORAL NIGHTLY
Qty: 90 TABLET | Refills: 3 | Status: SHIPPED | OUTPATIENT
Start: 2025-03-25

## 2025-03-25 RX ORDER — HYDROCODONE BITARTRATE AND ACETAMINOPHEN 10; 325 MG/1; MG/1
1 TABLET ORAL EVERY 6 HOURS PRN
Qty: 120 TABLET | Refills: 0 | Status: SHIPPED | OUTPATIENT
Start: 2025-03-25 | End: 2025-04-24

## 2025-03-25 NOTE — TELEPHONE ENCOUNTER
Refill request received from patient      Next Office Visit Date:  Future Appointments   Date Time Provider Department Center   4/28/2025  3:00 PM [facility]rum Valentine [facility] - Please review via PDMP        Last Office Visit:    1/28/2025

## 2025-03-25 NOTE — TELEPHONE ENCOUNTER
Refill request received from pharmacy      Next Office Visit Date:  Future Appointments   Date Time Provider Department Center   4/28/2025  3:00 PM [facility]rum [facility] - Please review via PDMP        Last Office Visit:    1/28/2025

## 2025-04-08 DIAGNOSIS — F41.9 ANXIETY: ICD-10-CM

## 2025-04-08 DIAGNOSIS — F32.9 REACTIVE DEPRESSION: ICD-10-CM

## 2025-04-09 NOTE — TELEPHONE ENCOUNTER
Refill request received from pharmacy      Next Office Visit Date:  Future Appointments   Date Time Provider Department Center   4/28/2025  3:00 PM [facility]rum Valentine [facility] - Please review via PDMP        Last Office Visit:    1/28/2025

## 2025-04-10 RX ORDER — ESCITALOPRAM OXALATE 20 MG/1
20 TABLET ORAL DAILY
Qty: 90 TABLET | Refills: 3 | Status: SHIPPED | OUTPATIENT
Start: 2025-04-10

## 2025-04-28 ENCOUNTER — OFFICE VISIT (OUTPATIENT)
Age: 25
End: 2025-04-28
Payer: MEDICAID

## 2025-04-28 ENCOUNTER — HOSPITAL ENCOUNTER (OUTPATIENT)
Facility: HOSPITAL | Age: 25
Discharge: HOME | End: 2025-04-28
Payer: MEDICAID

## 2025-04-28 DIAGNOSIS — G89.29 CHRONIC MIDLINE THORACIC BACK PAIN: ICD-10-CM

## 2025-04-28 DIAGNOSIS — R53.82 CHRONIC FATIGUE: ICD-10-CM

## 2025-04-28 DIAGNOSIS — G62.0 CHEMOTHERAPY-INDUCED PERIPHERAL NEUROPATHY: ICD-10-CM

## 2025-04-28 DIAGNOSIS — M62.81 MUSCULAR WEAKNESS: ICD-10-CM

## 2025-04-28 DIAGNOSIS — G89.29 CHRONIC MIDLINE LOW BACK PAIN WITHOUT SCIATICA: ICD-10-CM

## 2025-04-28 DIAGNOSIS — F41.9 ANXIETY: ICD-10-CM

## 2025-04-28 DIAGNOSIS — T45.1X5A CHEMOTHERAPY-INDUCED PERIPHERAL NEUROPATHY: ICD-10-CM

## 2025-04-28 DIAGNOSIS — E55.9 VITAMIN D DEFICIENCY: ICD-10-CM

## 2025-04-28 DIAGNOSIS — F32.9 REACTIVE DEPRESSION: ICD-10-CM

## 2025-04-28 DIAGNOSIS — M62.81 MUSCULAR WEAKNESS: Primary | ICD-10-CM

## 2025-04-28 DIAGNOSIS — M79.672 LEFT FOOT PAIN: ICD-10-CM

## 2025-04-28 DIAGNOSIS — M54.6 CHRONIC MIDLINE THORACIC BACK PAIN: ICD-10-CM

## 2025-04-28 DIAGNOSIS — M54.50 CHRONIC MIDLINE LOW BACK PAIN WITHOUT SCIATICA: ICD-10-CM

## 2025-04-28 PROCEDURE — 84100 ASSAY OF PHOSPHORUS: CPT

## 2025-04-28 PROCEDURE — 80053 COMPREHEN METABOLIC PANEL: CPT

## 2025-04-28 PROCEDURE — 83735 ASSAY OF MAGNESIUM: CPT

## 2025-04-28 PROCEDURE — 82746 ASSAY OF FOLIC ACID SERUM: CPT

## 2025-04-28 PROCEDURE — 84439 ASSAY OF FREE THYROXINE: CPT

## 2025-04-28 PROCEDURE — 85027 COMPLETE CBC AUTOMATED: CPT

## 2025-04-28 PROCEDURE — 99214 OFFICE O/P EST MOD 30 MIN: CPT | Performed by: FAMILY MEDICINE

## 2025-04-28 PROCEDURE — 84443 ASSAY THYROID STIM HORMONE: CPT

## 2025-04-28 PROCEDURE — 82306 VITAMIN D 25 HYDROXY: CPT

## 2025-04-28 PROCEDURE — 82607 VITAMIN B-12: CPT

## 2025-04-28 RX ORDER — HYDROCODONE BITARTRATE AND ACETAMINOPHEN 10; 325 MG/1; MG/1
1 TABLET ORAL EVERY 6 HOURS PRN
Qty: 120 TABLET | Refills: 0 | Status: SHIPPED | OUTPATIENT
Start: 2025-04-28 | End: 2025-05-28

## 2025-04-28 ASSESSMENT — ENCOUNTER SYMPTOMS
GASTROINTESTINAL NEGATIVE: 1
EYES NEGATIVE: 1
RESPIRATORY NEGATIVE: 1

## 2025-04-28 NOTE — PROGRESS NOTES
SUBJECTIVE:    Patient ID: Migue lA House is a 25 y.o. male.    Chief Complaint   Patient presents with    ADHD     Reg f/U    Anxiety     Reg F/U       HPI: office visit  History of Present Illness  The patient presents for evaluation of sleep issues and medication management.    A satisfactory response to the current medication regimen is reported, with no adverse effects noted. No new health concerns are mentioned. He has not sought medical attention at the  since the last consultation.    Sleep patterns are irregular, often resulting in daytime fatigue. A consistent bedtime routine is not adhered to.         Review of Systems   Constitutional:  Positive for fatigue.   Eyes: Negative.    Respiratory: Negative.     Cardiovascular: Negative.    Gastrointestinal: Negative.    Endocrine: Negative.    Genitourinary: Negative.    Musculoskeletal: Negative.    Neurological: Negative.    Psychiatric/Behavioral: Negative.     All other systems reviewed and are negative.       OBJECTIVE:  There were no vitals taken for this visit.   Wt Readings from Last 3 Encounters:   01/28/25 73.8 kg (162 lb 9.6 oz)   10/29/24 72.5 kg (159 lb 12.8 oz)   07/25/24 71.7 kg (158 lb)     BP Readings from Last 3 Encounters:   01/28/25 110/70   10/29/24 120/82   07/25/24 106/74      Pulse Readings from Last 3 Encounters:   01/28/25 78   10/29/24 94   07/25/24 65     There is no height or weight on file to calculate BMI.   Resp Readings from Last 3 Encounters:   01/28/25 16   10/29/24 16   07/25/24 16     Past medical, surgical, family and social history were reviewed and updated with the patient.     Physical Exam  Vitals and nursing note reviewed.   Constitutional:       Appearance: Normal appearance. He is underweight. He is not ill-appearing, toxic-appearing or diaphoretic.   HENT:      Head: Normocephalic and atraumatic.      Right Ear: Ear canal and external ear normal. Decreased hearing noted. A middle ear effusion is present. Tympanic

## 2025-04-28 NOTE — PROGRESS NOTES
Have you been to the ER, urgent care clinic since your last visit?  Hospitalized since your last visit?   NO    Have you seen or consulted any other health care providers outside our system since your last visit?   no

## 2025-04-29 LAB
25(OH)D3 SERPL-MCNC: 39.5 NG/ML (ref 32–100)
ALBUMIN SERPL-MCNC: 4.5 G/DL (ref 3.4–4.8)
ALBUMIN/GLOB SERPL: 2 {RATIO} (ref 0.8–2)
ALP SERPL-CCNC: 142 U/L (ref 25–100)
ALT SERPL-CCNC: 13 U/L (ref 4–36)
ANION GAP SERPL CALCULATED.3IONS-SCNC: 11 MMOL/L (ref 3–16)
AST SERPL-CCNC: 17 U/L (ref 8–33)
BILIRUB SERPL-MCNC: 0.4 MG/DL (ref 0.3–1.2)
BUN SERPL-MCNC: 17 MG/DL (ref 6–20)
CALCIUM SERPL-MCNC: 9.6 MG/DL (ref 8.3–10.6)
CHLORIDE SERPL-SCNC: 107 MMOL/L (ref 98–107)
CO2 SERPL-SCNC: 27 MMOL/L (ref 20–30)
CREAT SERPL-MCNC: 0.9 MG/DL (ref 0.4–1.2)
ERYTHROCYTE [DISTWIDTH] IN BLOOD BY AUTOMATED COUNT: 11.9 % (ref 11–16)
FOLATE SERPL-MCNC: 6.69 NG/ML
GFR SERPLBLD CREATININE-BSD FMLA CKD-EPI: >90 ML/MIN/{1.73_M2}
GLOBULIN SER CALC-MCNC: 2.3 G/DL
GLUCOSE SERPL-MCNC: 76 MG/DL (ref 74–106)
HCT VFR BLD AUTO: 43.9 % (ref 40–54)
HGB BLD-MCNC: 14.8 G/DL (ref 13–18)
MAGNESIUM SERPL-MCNC: 1.9 MG/DL (ref 1.7–2.4)
MCH RBC QN AUTO: 31.6 PG (ref 27–32)
MCHC RBC AUTO-ENTMCNC: 33.7 G/DL (ref 31–35)
MCV RBC AUTO: 93.8 FL (ref 80–100)
PHOSPHATE SERPL-MCNC: 3.4 MG/DL (ref 2.5–4.5)
PLATELET # BLD AUTO: 226 K/UL (ref 150–400)
PMV BLD AUTO: 10.8 FL (ref 6–10)
POTASSIUM SERPL-SCNC: 3.8 MMOL/L (ref 3.4–5.1)
PROT SERPL-MCNC: 6.8 G/DL (ref 6.4–8.3)
RBC # BLD AUTO: 4.68 M/UL (ref 4.5–6)
SODIUM SERPL-SCNC: 145 MMOL/L (ref 136–145)
T4 FREE SERPL-MCNC: 1.4 NG/DL (ref 0.92–1.68)
TSH SERPL DL<=0.005 MIU/L-ACNC: 0.56 UIU/ML (ref 0.27–4.2)
VIT B12 SERPL-MCNC: 649 PG/ML (ref 211–911)
WBC # BLD AUTO: 9.3 K/UL (ref 4–11)

## 2025-05-08 ENCOUNTER — RESULTS FOLLOW-UP (OUTPATIENT)
Age: 25
End: 2025-05-08

## 2025-05-14 ENCOUNTER — TELEPHONE (OUTPATIENT)
Age: 25
End: 2025-05-14

## 2025-05-14 NOTE — TELEPHONE ENCOUNTER
Patients grandmother called and stated that patient needs incontinence supplies and I do not see a diagnosis for that in his chart can you please review?

## 2025-05-21 ENCOUNTER — TELEPHONE (OUTPATIENT)
Age: 25
End: 2025-05-21

## 2025-05-21 NOTE — TELEPHONE ENCOUNTER
Patients  called asking about an update for incontinence supplies. I advised her you were out to lunch but I would send you a message. Her number is [phone].

## 2025-05-21 NOTE — TELEPHONE ENCOUNTER
Dr. Valentine has not reviewed patients problem list and updated last office note to suggest patient has incontinence issues. I have nothing new to report to patients . When Dr. Valentine reviews and lets me know I will then send the needed documentation to the DME company.

## 2025-05-24 DIAGNOSIS — E55.9 VITAMIN D DEFICIENCY: ICD-10-CM

## 2025-05-27 DIAGNOSIS — G62.0 CHEMOTHERAPY-INDUCED PERIPHERAL NEUROPATHY: ICD-10-CM

## 2025-05-27 DIAGNOSIS — T45.1X5A CHEMOTHERAPY-INDUCED PERIPHERAL NEUROPATHY: ICD-10-CM

## 2025-05-27 DIAGNOSIS — M54.6 CHRONIC MIDLINE THORACIC BACK PAIN: ICD-10-CM

## 2025-05-27 DIAGNOSIS — G89.29 CHRONIC MIDLINE THORACIC BACK PAIN: ICD-10-CM

## 2025-05-27 DIAGNOSIS — M54.50 CHRONIC MIDLINE LOW BACK PAIN WITHOUT SCIATICA: ICD-10-CM

## 2025-05-27 DIAGNOSIS — M79.672 LEFT FOOT PAIN: ICD-10-CM

## 2025-05-27 DIAGNOSIS — G89.29 CHRONIC MIDLINE LOW BACK PAIN WITHOUT SCIATICA: ICD-10-CM

## 2025-05-27 RX ORDER — OMEGA-3S/DHA/EPA/FISH OIL/D3 300MG-1000
400 CAPSULE ORAL DAILY
Qty: 30 TABLET | Refills: 0 | Status: SHIPPED | OUTPATIENT
Start: 2025-05-27

## 2025-05-27 RX ORDER — HYDROCODONE BITARTRATE AND ACETAMINOPHEN 10; 325 MG/1; MG/1
1 TABLET ORAL EVERY 6 HOURS PRN
Qty: 120 TABLET | Refills: 0 | Status: SHIPPED | OUTPATIENT
Start: 2025-05-27 | End: 2025-06-26

## 2025-05-27 NOTE — TELEPHONE ENCOUNTER
Refill request received from patient      Next Office Visit Date:  Future Appointments   Date Time Provider Department Center   7/29/2025  2:45 PM Erum Valentine MD [facility]       [facility] - Please review via PDMP        Last Office Visit:    4/28/2025

## 2025-05-27 NOTE — TELEPHONE ENCOUNTER
Refill request received from pharmacy      Next Office Visit Date:  Future Appointments   Date Time Provider Department Center   7/29/2025  2:45 PM Erum Valentine MD [facility] - Please review via PDMP        Last Office Visit:    4/28/2025

## 2025-06-01 ENCOUNTER — HOSPITAL ENCOUNTER (EMERGENCY)
Facility: HOSPITAL | Age: 25
Discharge: HOME | End: 2025-06-01
Attending: HOSPITALIST
Payer: MEDICAID

## 2025-06-01 VITALS
DIASTOLIC BLOOD PRESSURE: 75 MMHG | HEIGHT: 72 IN | SYSTOLIC BLOOD PRESSURE: 132 MMHG | TEMPERATURE: 97.4 F | BODY MASS INDEX: 21.54 KG/M2 | HEART RATE: 89 BPM | WEIGHT: 159 LBS | OXYGEN SATURATION: 97 % | RESPIRATION RATE: 18 BRPM

## 2025-06-01 DIAGNOSIS — G89.4 CHRONIC PAIN SYNDROME: Primary | ICD-10-CM

## 2025-06-01 PROCEDURE — 2500000003 HC RX 250 WO HCPCS: Performed by: HOSPITALIST

## 2025-06-01 PROCEDURE — 99282 EMERGENCY DEPT VISIT SF MDM: CPT

## 2025-06-01 PROCEDURE — 96372 THER/PROPH/DIAG INJ SC/IM: CPT

## 2025-06-01 RX ADMIN — HYDROMORPHONE HYDROCHLORIDE 1 MG: 1 INJECTION, SOLUTION INTRAMUSCULAR; INTRAVENOUS; SUBCUTANEOUS at 20:13

## 2025-06-01 ASSESSMENT — PAIN - FUNCTIONAL ASSESSMENT: PAIN_FUNCTIONAL_ASSESSMENT: NONE - DENIES PAIN

## 2025-06-01 NOTE — ED PROVIDER NOTES
out of the ordinary.  Again patient states this is just a chronic exacerbation of his condition.  He does take Xanax at home he states he took 1 around 4:00 he states he does not think he needs anything as far as any muscle relaxer goes but he is requesting something for pain to help ease off the muscle discomfort.  Denies any loss of bowel or bladder control.  Patient states he does have neck mid and lower back pain.  Which are chronic for this patient.  Denies any other complaint at this time.  Past medical history is pertinent for acute lymphoblastic leukemia in remission, ADHD, anxiety, asthma, headache    After initial evaluation examination I did have a conversation with the patient his mother about the upcoming plan, treatment possible disposition which they were agreeable to the consultation.  Mother is concerned because this reason they found the ALL was because of muscle discomfort.  Patient states he has the discomfort again but he is had this on and off.  Patient was offered further evaluation such as chest x-ray or CT scan of the chest make sure there is no acute abnormality he declined.  He states he just wants the pain medication so he can go home.  Advised that we give him a shot of Dilaudid here we are initially going to muscle laxer's but he is on Xanax at home took a dose around 4:00 states he does not think he needs a muscle relaxer he just wants the something for pain.  Advised that we can let him be discharged 20 minutes after shot time.  Advise it will take about 45 minutes to an hour before the pain medication really start to have any effect and hopefully by then the medication that he has at home can also help with his discomfort.  Otherwise he will be discharged home in stable condition.  Again declined any further evaluation workup.  Advised he does need to follow-up with his regular family physician within the next 1 to 2 days for evaluation.  Also given instructions if his symptoms

## 2025-06-02 ENCOUNTER — TELEPHONE (OUTPATIENT)
Age: 25
End: 2025-06-02

## 2025-06-02 NOTE — TELEPHONE ENCOUNTER
[facility]  called stating on 6/1/25 patient was experiencing additional shoulder,neck and back pain.He did go on to the ER and they have told him to follow up with PCP in 1 or 2 days.

## 2025-06-11 ENCOUNTER — TELEPHONE (OUTPATIENT)
Age: 25
End: 2025-06-11

## 2025-06-11 ENCOUNTER — HOSPITAL ENCOUNTER (EMERGENCY)
Facility: HOSPITAL | Age: 25
Discharge: HOME | End: 2025-06-11
Attending: INTERNAL MEDICINE
Payer: MEDICAID

## 2025-06-11 ENCOUNTER — APPOINTMENT (OUTPATIENT)
Dept: CT IMAGING | Facility: HOSPITAL | Age: 25
End: 2025-06-11
Payer: MEDICAID

## 2025-06-11 VITALS
DIASTOLIC BLOOD PRESSURE: 82 MMHG | WEIGHT: 159 LBS | SYSTOLIC BLOOD PRESSURE: 115 MMHG | HEART RATE: 78 BPM | BODY MASS INDEX: 21.54 KG/M2 | RESPIRATION RATE: 16 BRPM | TEMPERATURE: 98.6 F | OXYGEN SATURATION: 98 % | HEIGHT: 72 IN

## 2025-06-11 DIAGNOSIS — R04.0 EPISTAXIS: Primary | ICD-10-CM

## 2025-06-11 LAB
ALBUMIN SERPL-MCNC: 4.3 G/DL (ref 3.4–4.8)
ALBUMIN/GLOB SERPL: 1.7 {RATIO} (ref 0.8–2)
ALP SERPL-CCNC: 118 U/L (ref 25–100)
ALT SERPL-CCNC: 19 U/L (ref 4–36)
ANION GAP SERPL CALCULATED.3IONS-SCNC: 11 MMOL/L (ref 3–16)
AST SERPL-CCNC: 19 U/L (ref 8–33)
BASOPHILS # BLD: 0 K/UL (ref 0–0.1)
BASOPHILS NFR BLD: 0.4 %
BILIRUB SERPL-MCNC: 0.5 MG/DL (ref 0.3–1.2)
BUN SERPL-MCNC: 15 MG/DL (ref 6–20)
CALCIUM SERPL-MCNC: 9.6 MG/DL (ref 8.3–10.6)
CHLORIDE SERPL-SCNC: 105 MMOL/L (ref 98–107)
CO2 SERPL-SCNC: 25 MMOL/L (ref 20–30)
CREAT SERPL-MCNC: 0.6 MG/DL (ref 0.9–1.3)
EOSINOPHIL # BLD: 0.4 K/UL (ref 0–0.4)
EOSINOPHIL NFR BLD: 4.6 %
ERYTHROCYTE [DISTWIDTH] IN BLOOD BY AUTOMATED COUNT: 12.2 % (ref 11–16)
GFR SERPLBLD CREATININE-BSD FMLA CKD-EPI: >90 ML/MIN/{1.73_M2}
GLOBULIN SER CALC-MCNC: 2.6 G/DL
GLUCOSE SERPL-MCNC: 107 MG/DL (ref 74–106)
HCT VFR BLD AUTO: 42.9 % (ref 40–54)
HGB BLD-MCNC: 14.7 G/DL (ref 13–18)
IMM GRANULOCYTES # BLD: 0 K/UL
IMM GRANULOCYTES NFR BLD: 0.2 % (ref 0–5)
INR PPP: 1.04 (ref 0.88–1.12)
LYMPHOCYTES # BLD: 2.7 K/UL (ref 1.5–4)
LYMPHOCYTES NFR BLD: 30.4 %
MCH RBC QN AUTO: 31.8 PG (ref 27–32)
MCHC RBC AUTO-ENTMCNC: 34.3 G/DL (ref 31–35)
MCV RBC AUTO: 92.9 FL (ref 80–100)
MONOCYTES # BLD: 0.5 K/UL (ref 0.2–0.8)
MONOCYTES NFR BLD: 5.1 %
NEUTROPHILS # BLD: 5.3 K/UL (ref 2–7.5)
NEUTS SEG NFR BLD: 59.3 %
PLATELET # BLD AUTO: 240 K/UL (ref 150–400)
PMV BLD AUTO: 8.9 FL (ref 6–10)
POTASSIUM SERPL-SCNC: 3.8 MMOL/L (ref 3.4–5.1)
PROT SERPL-MCNC: 6.9 G/DL (ref 6.4–8.3)
PROTHROMBIN TIME: 13.8 SEC (ref 12–14.8)
RBC # BLD AUTO: 4.62 M/UL (ref 4.5–6)
SODIUM SERPL-SCNC: 141 MMOL/L (ref 136–145)
WBC # BLD AUTO: 9 K/UL (ref 4–11)

## 2025-06-11 PROCEDURE — 85025 COMPLETE CBC W/AUTO DIFF WBC: CPT

## 2025-06-11 PROCEDURE — 36415 COLL VENOUS BLD VENIPUNCTURE: CPT

## 2025-06-11 PROCEDURE — 71260 CT THORAX DX C+: CPT

## 2025-06-11 PROCEDURE — 80053 COMPREHEN METABOLIC PANEL: CPT

## 2025-06-11 PROCEDURE — 85610 PROTHROMBIN TIME: CPT

## 2025-06-11 PROCEDURE — 99285 EMERGENCY DEPT VISIT HI MDM: CPT

## 2025-06-11 PROCEDURE — 6360000004 HC RX CONTRAST MEDICATION: Performed by: INTERNAL MEDICINE

## 2025-06-11 RX ORDER — IOPAMIDOL 755 MG/ML
100 INJECTION, SOLUTION INTRAVASCULAR
Status: COMPLETED | OUTPATIENT
Start: 2025-06-11 | End: 2025-06-11

## 2025-06-11 RX ADMIN — IOPAMIDOL 100 ML: 755 INJECTION, SOLUTION INTRAVENOUS at 18:10

## 2025-06-11 ASSESSMENT — PAIN - FUNCTIONAL ASSESSMENT: PAIN_FUNCTIONAL_ASSESSMENT: 0-10

## 2025-06-11 ASSESSMENT — PAIN SCALES - GENERAL: PAINLEVEL_OUTOF10: 4

## 2025-06-11 ASSESSMENT — LIFESTYLE VARIABLES
HOW MANY STANDARD DRINKS CONTAINING ALCOHOL DO YOU HAVE ON A TYPICAL DAY: PATIENT DOES NOT DRINK
HOW OFTEN DO YOU HAVE A DRINK CONTAINING ALCOHOL: NEVER

## 2025-06-11 ASSESSMENT — PAIN DESCRIPTION - PAIN TYPE: TYPE: ACUTE PAIN

## 2025-06-11 ASSESSMENT — PAIN DESCRIPTION - ONSET: ONSET: GRADUAL

## 2025-06-11 ASSESSMENT — PAIN DESCRIPTION - DESCRIPTORS: DESCRIPTORS: THROBBING;SHARP

## 2025-06-11 ASSESSMENT — PAIN DESCRIPTION - ORIENTATION: ORIENTATION: RIGHT

## 2025-06-11 ASSESSMENT — PAIN DESCRIPTION - LOCATION: LOCATION: ARM;SHOULDER

## 2025-06-11 ASSESSMENT — PAIN DESCRIPTION - FREQUENCY: FREQUENCY: CONTINUOUS

## 2025-06-11 NOTE — TELEPHONE ENCOUNTER
Patients grandmother called and ask for an appointment with Dr. Valentine she states he has been having nose bleeds and he had coughed up some blood. Informed patient that Dr. Valentine is out of the office and we could not accommodate with an appointment today and this time time a day we have no availability left for same day appointments. I recommended ED so they could do imaging if they felt necessary. Ask Grandma if he had nose bleed that wouldn't stop and she said no. And the blood he coughed up was a little on the kleenex. Informed patients grandma to ED she said they waited last time for 12 hours at  she stated that he was unable to sit in the wheelchair that long. Recommended local ED she said that she might do that. Patient has been to [facility] for pain in the last month.

## 2025-06-11 NOTE — ED TRIAGE NOTES
Pt to ED with complaints of nose bleed and coughing up blood this morning. Pt reports only one episode this morning. Pt also complains of neck, shoulder and back pain.

## 2025-06-11 NOTE — ED PROVIDER NOTES
[facility]  EMERGENCY DEPARTMENT ENCOUNTER        Pt Name: Miguel A House  MRN: [id number]  Birthdate 2000  Date of evaluation: 6/11/2025  Provider: Guru Hughes DO  PCP: Erum Valentine MD  Note Started: 5:35 PM EDT 6/11/25    CHIEF COMPLAINT       Chief Complaint   Patient presents with    Epistaxis    Hemoptysis       HISTORY OF PRESENT ILLNESS: 1 or more Elements     History from : Patient    Limitations to history : None    Miguel A House is a 25 y.o. male who presents after having 1 episode of hemoptysis and 1 episode of epistaxis.  Patient reports symptoms earlier today.  He has history of T-cell lymphoma and as such called his oncologist to recommend coming to the emergency department for \"scans\".  Patient denies any further epistaxis/hemoptysis.  Patient does report he believes that he had a \"metallic taste\" prior to his single episode of hemoptysis this morning.  He reports epistaxis was self-limiting in nature and was very small amount less than 1 teaspoon.  No further episodic bleeding.    Nursing Notes were all reviewed and agreed with or any disagreements were addressed in the HPI.    REVIEW OF SYSTEMS :      Review of Systems     systems reviewed and negative except as in HPI/MDM    PAST MEDICAL HISTORY     Past Medical History:   Diagnosis Date    Acute lymphoblastic leukemia (ALL) in remission (HCC) 8/8/2017    ADHD (attention deficit hyperactivity disorder)     Anxiety 8/8/2017    Asthma     Headache(784.0)        SURGICAL HISTORY   History reviewed. No pertinent surgical history.    CURRENTMEDICATIONS       Discharge Medication List as of 6/11/2025  6:47 PM        CONTINUE these medications which have NOT CHANGED    Details   VITAMIN D3 10 MCG (400 UNIT) TABS tablet TAKE 1 TABLET BY MOUTH DAILY, Disp-30 tablet, R-0Normal      HYDROcodone-acetaminophen (NORCO)  MG per tablet Take 1 tablet by mouth every 6 hours as needed for Pain for up to 30  Acute lymphoblastic leukemia (ALL) in remission (HCC) 8/8/2017    ADHD (attention deficit hyperactivity disorder)     Anxiety 8/8/2017    Asthma     Headache(784.0)        Records Reviewed : None    Disposition Considerations (include 1 Tests not done, Shared Decision Making, Pt Expectation of Test or Tx.):   Appropriate for outpatient management        I am the Primary Clinician of Record.    FINAL IMPRESSION      1. Epistaxis          DISPOSITION/PLAN     DISPOSITION Decision To Discharge 06/11/2025 06:40:01 PM   DISPOSITION CONDITION Stable           PATIENT REFERRED TO:  Erum Valentine MD  [address] [facility] [id number] [phone]    In 3 days        DISCHARGE MEDICATIONS:  Discharge Medication List as of 6/11/2025  6:47 PM          DISCONTINUED MEDICATIONS:  Discharge Medication List as of 6/11/2025  6:47 PM                 (Please note that portions of this note were completed with a voice recognition program.  Efforts were made to edit the dictations but occasionally words are mis-transcribed.)    Guru Hughes DO (electronically signed)           Guru Hughes DO  06/11/25 [id number]       Guru Hughes DO  06/12/25 [id number]

## 2025-06-12 ENCOUNTER — TELEPHONE (OUTPATIENT)
Age: 25
End: 2025-06-12

## 2025-06-12 NOTE — TELEPHONE ENCOUNTER
[facility] called stating patient did go to [facility] due to his nose bleeding.The hospital did chest xray ct scan and all came back clear.he is currently on a nasal spray to help with this.

## 2025-06-18 ENCOUNTER — HOSPITAL ENCOUNTER (OUTPATIENT)
Facility: HOSPITAL | Age: 25
Discharge: HOME | End: 2025-06-18
Payer: MEDICAID

## 2025-06-18 PROCEDURE — 36415 COLL VENOUS BLD VENIPUNCTURE: CPT

## 2025-06-18 PROCEDURE — 82306 VITAMIN D 25 HYDROXY: CPT

## 2025-06-18 PROCEDURE — 80061 LIPID PANEL: CPT

## 2025-06-18 PROCEDURE — 82746 ASSAY OF FOLIC ACID SERUM: CPT

## 2025-06-18 PROCEDURE — 84443 ASSAY THYROID STIM HORMONE: CPT

## 2025-06-18 PROCEDURE — 85025 COMPLETE CBC W/AUTO DIFF WBC: CPT

## 2025-06-18 PROCEDURE — 83036 HEMOGLOBIN GLYCOSYLATED A1C: CPT

## 2025-06-18 PROCEDURE — 82607 VITAMIN B-12: CPT

## 2025-06-18 PROCEDURE — 80053 COMPREHEN METABOLIC PANEL: CPT

## 2025-06-19 LAB
25(OH)D3 SERPL-MCNC: 41.3 NG/ML (ref 32–100)
ALBUMIN SERPL-MCNC: 4.7 G/DL (ref 3.4–4.8)
ALBUMIN/GLOB SERPL: 1.9 {RATIO} (ref 0.8–2)
ALP SERPL-CCNC: 129 U/L (ref 25–100)
ALT SERPL-CCNC: 19 U/L (ref 4–36)
ANION GAP SERPL CALCULATED.3IONS-SCNC: 11 MMOL/L (ref 3–16)
AST SERPL-CCNC: 23 U/L (ref 8–33)
BASOPHILS # BLD: 0.1 K/UL (ref 0–0.1)
BASOPHILS NFR BLD: 0.8 %
BILIRUB SERPL-MCNC: 0.7 MG/DL (ref 0.3–1.2)
BUN SERPL-MCNC: 18 MG/DL (ref 6–20)
CALCIUM SERPL-MCNC: 9.9 MG/DL (ref 8.3–10.6)
CHLORIDE SERPL-SCNC: 104 MMOL/L (ref 98–107)
CHOLEST SERPL-MCNC: 158 MG/DL (ref 0–200)
CO2 SERPL-SCNC: 28 MMOL/L (ref 20–30)
CREAT SERPL-MCNC: 0.7 MG/DL (ref 0.9–1.3)
EOSINOPHIL # BLD: 0.2 K/UL (ref 0–0.4)
EOSINOPHIL NFR BLD: 3.7 %
ERYTHROCYTE [DISTWIDTH] IN BLOOD BY AUTOMATED COUNT: 12 % (ref 11–16)
FOLATE SERPL-MCNC: 8.8 NG/ML
GFR SERPLBLD CREATININE-BSD FMLA CKD-EPI: >90 ML/MIN/{1.73_M2}
GLOBULIN SER CALC-MCNC: 2.5 G/DL
GLUCOSE SERPL-MCNC: 98 MG/DL (ref 74–106)
HBA1C MFR BLD: 5 %
HCT VFR BLD AUTO: 45.9 % (ref 40–54)
HDLC SERPL-MCNC: 34 MG/DL (ref 40–60)
HGB BLD-MCNC: 15.4 G/DL (ref 13–18)
IMM GRANULOCYTES # BLD: 0 K/UL
IMM GRANULOCYTES NFR BLD: 0.2 % (ref 0–5)
LDLC SERPL CALC-MCNC: 103 MG/DL
LYMPHOCYTES # BLD: 2.1 K/UL (ref 1.5–4)
LYMPHOCYTES NFR BLD: 35.2 %
MCH RBC QN AUTO: 31.3 PG (ref 27–32)
MCHC RBC AUTO-ENTMCNC: 33.6 G/DL (ref 31–35)
MCV RBC AUTO: 93.3 FL (ref 80–100)
MONOCYTES # BLD: 0.2 K/UL (ref 0.2–0.8)
MONOCYTES NFR BLD: 4 %
NEUTROPHILS # BLD: 3.4 K/UL (ref 2–7.5)
NEUTS SEG NFR BLD: 56.1 %
PLATELET # BLD AUTO: 237 K/UL (ref 150–400)
PMV BLD AUTO: 10.1 FL (ref 6–10)
POTASSIUM SERPL-SCNC: 4.4 MMOL/L (ref 3.4–5.1)
PROT SERPL-MCNC: 7.2 G/DL (ref 6.4–8.3)
RBC # BLD AUTO: 4.92 M/UL (ref 4.5–6)
SODIUM SERPL-SCNC: 143 MMOL/L (ref 136–145)
TRIGL SERPL-MCNC: 104 MG/DL (ref 0–249)
TSH SERPL DL<=0.005 MIU/L-ACNC: 0.63 UIU/ML (ref 0.27–4.2)
VIT B12 SERPL-MCNC: 713 PG/ML (ref 211–911)
VLDLC SERPL CALC-MCNC: 21 MG/DL
WBC # BLD AUTO: 6 K/UL (ref 4–11)

## 2025-06-22 DIAGNOSIS — E55.9 VITAMIN D DEFICIENCY: ICD-10-CM

## 2025-06-22 RX ORDER — OMEGA-3S/DHA/EPA/FISH OIL/D3 300MG-1000
400 CAPSULE ORAL DAILY
Qty: 30 TABLET | Refills: 0 | OUTPATIENT
Start: 2025-06-22

## 2025-06-24 DIAGNOSIS — E55.9 VITAMIN D DEFICIENCY: ICD-10-CM

## 2025-06-24 RX ORDER — OMEGA-3S/DHA/EPA/FISH OIL/D3 300MG-1000
400 CAPSULE ORAL DAILY
Qty: 14 TABLET | Refills: 0 | Status: SHIPPED | OUTPATIENT
Start: 2025-06-24

## 2025-06-24 NOTE — TELEPHONE ENCOUNTER
2 week supply being sent due to patient being discharged. Letter mailed 6/9/25. We have to send maintenance medications for 30 days.

## 2025-07-02 DIAGNOSIS — G89.29 CHRONIC MIDLINE THORACIC BACK PAIN: ICD-10-CM

## 2025-07-02 DIAGNOSIS — M54.50 CHRONIC MIDLINE LOW BACK PAIN WITHOUT SCIATICA: ICD-10-CM

## 2025-07-02 DIAGNOSIS — G62.0 CHEMOTHERAPY-INDUCED PERIPHERAL NEUROPATHY: ICD-10-CM

## 2025-07-02 DIAGNOSIS — M54.6 CHRONIC MIDLINE THORACIC BACK PAIN: ICD-10-CM

## 2025-07-02 DIAGNOSIS — T45.1X5A CHEMOTHERAPY-INDUCED PERIPHERAL NEUROPATHY: ICD-10-CM

## 2025-07-02 DIAGNOSIS — M79.672 LEFT FOOT PAIN: ICD-10-CM

## 2025-07-02 DIAGNOSIS — G89.29 CHRONIC MIDLINE LOW BACK PAIN WITHOUT SCIATICA: ICD-10-CM

## 2025-07-02 NOTE — TELEPHONE ENCOUNTER
Patient requesting a bridge prescription until he gets into pain management on July 31, 2025.    Refill request received from patient      Next Office Visit Date:  No future appointments.    [facility]AN - Please review via PDMP        Last Office Visit:    4/28/2025

## 2025-07-03 ENCOUNTER — TELEPHONE (OUTPATIENT)
Age: 25
End: 2025-07-03

## 2025-07-03 RX ORDER — HYDROCODONE BITARTRATE AND ACETAMINOPHEN 10; 325 MG/1; MG/1
1 TABLET ORAL EVERY 6 HOURS PRN
Qty: 120 TABLET | Refills: 0 | Status: SHIPPED | OUTPATIENT
Start: 2025-07-03 | End: 2025-08-02

## 2025-07-03 NOTE — TELEPHONE ENCOUNTER
Patients mother called asking about the refill of Hydrocodone. I advised her that it has been sent to Dr. Valentine and we are waiting on her to sign it. She asked what she should do about his withdrawals and I advised them that they would need to go to the ED to be evaluated. She verbalized understanding.